# Patient Record
Sex: MALE | Race: WHITE
[De-identification: names, ages, dates, MRNs, and addresses within clinical notes are randomized per-mention and may not be internally consistent; named-entity substitution may affect disease eponyms.]

---

## 2020-09-10 ENCOUNTER — HOSPITAL ENCOUNTER (OUTPATIENT)
Dept: HOSPITAL 47 - RADUSWWP | Age: 85
Discharge: HOME | End: 2020-09-10
Attending: PHYSICIAN ASSISTANT
Payer: MEDICARE

## 2020-09-10 DIAGNOSIS — I70.0: Primary | ICD-10-CM

## 2020-09-10 DIAGNOSIS — N18.3: ICD-10-CM

## 2020-09-10 PROCEDURE — 76770 US EXAM ABDO BACK WALL COMP: CPT

## 2020-09-10 NOTE — US
EXAMINATION TYPE: US kidneys/renal and bladder/ aorta

 

DATE OF EXAM: 9/10/2020

 

COMPARISON: NONE

 

CLINICAL HISTORY: N18.3 Chronic kidney disease stage 3.

 

EXAM MEASUREMENTS:

 

Right Kidney:  9.4 x 4.8 x 4.7 cm

Left Kidney: 9.9 x 5.7 x 5.2 cm

Post Void Residual Volume:  19.3 mL

 

US Aorta (as indicated by Procedure Code from ordering physician): Mild ectasia is noted throughout a
bdominal aorta, however, size is wnl. Irregular intimal wall thickening is noted throughout aorta. 

 

Right Kidney: No hydronephrosis or masses seen  

Left Kidney: No hydronephrosis or masses seen  

Bladder: not prepped; partially distended

**Bilateral Jets seen: yes

**Normal Post Void Residual: yes, as volume is less than 50.0ml.

 

There is no evidence for hydronephrosis at this point in time.  No nephrolithiasis is seen.  No yadira
s are identified.  The urinary bladder is anechoic.  Bilateral ureteral jets are seen.

 

 

 

IMPRESSION:

No distinct abnormality seen. Plaque abdominal aorta.

## 2021-04-06 ENCOUNTER — HOSPITAL ENCOUNTER (OUTPATIENT)
Dept: HOSPITAL 47 - LABWHC1 | Age: 86
Discharge: HOME | End: 2021-04-06
Attending: FAMILY MEDICINE
Payer: MEDICARE

## 2021-04-06 ENCOUNTER — HOSPITAL ENCOUNTER (OUTPATIENT)
Dept: HOSPITAL 47 - LABPAT | Age: 86
Discharge: HOME | End: 2021-04-06
Attending: ORTHOPAEDIC SURGERY
Payer: MEDICARE

## 2021-04-06 DIAGNOSIS — E87.5: Primary | ICD-10-CM

## 2021-04-06 DIAGNOSIS — M16.11: ICD-10-CM

## 2021-04-06 DIAGNOSIS — Z01.812: Primary | ICD-10-CM

## 2021-04-06 PROCEDURE — 87070 CULTURE OTHR SPECIMN AEROBIC: CPT

## 2021-04-06 PROCEDURE — 84132 ASSAY OF SERUM POTASSIUM: CPT

## 2021-04-06 PROCEDURE — 36415 COLL VENOUS BLD VENIPUNCTURE: CPT

## 2021-04-06 PROCEDURE — 86900 BLOOD TYPING SEROLOGIC ABO: CPT

## 2021-04-06 PROCEDURE — 86901 BLOOD TYPING SEROLOGIC RH(D): CPT

## 2021-04-06 PROCEDURE — 86850 RBC ANTIBODY SCREEN: CPT

## 2021-04-12 NOTE — HP
HISTORY AND PHYSICAL



CHIEF COMPLAINT:

Right hip pain.



HISTORY OF PRESENT ILLNESS:

The patient is an 85-year-old retired gentleman who presents with progressive right hip

pain for the past 2 years.  It has progressed recently.  He notes anterior groin and

thigh pain, worse with weightbearing activities.  He has tried therapy in addition

injection and medications with only partial temporary relief.  He notes the pain

severely limits his function and activities.



PAST MEDICAL HISTORY:

Significant for hypertension and arthritis.



PAST SURGICAL HISTORY:

Negative.



CURRENT MEDICATIONS:

Current medications include losartan.



ALLERGIES:

He denies drug allergies.



FAMILY HISTORY:

Negative.



SOCIAL HISTORY:

Significant for previous tobacco use.



REVIEW OF SYSTEMS:

Sixteen-point review of systems otherwise reviewed and is noncontributory.



PHYSICAL EXAMINATION:

On examination, the patient is approximately 5 feet 10 inches, 177 pounds of

mesomorphic habitus.

HEENT exam is nonfocal.

Neck is supple.

On examination of the right hip, he is tender about the anterior aspect.  Passive range

of motion flexion 85 degrees, external rotation with the hip flexed 60 degrees,

internal rotation -10 degrees with pain.  He does have approximately 1 cm shortening of

the right lower extremity compared to the left.  His distal neurovascular exam appears

intact in the right lower extremity.



Previous x-rays of the right hip obtained in the office to include AP and lateral views

show severe osteoarthrosis with bone-on-bone changes.



IMPRESSION:

Right hip severe osteoarthrosis-symptomatic.



RECOMMENDATIONS:

I talked to the patient at length regarding his condition and treatment options.  At

this point, he is quite symptomatic and limited because of pain despite previous

conservative measures.  After thorough discussion, he opts to proceed with surgery.  We

will plan to proceed with right total hip arthroplasty utilizing an anterior approach.

Risks and benefits were discussed at length in layman's terms.  We will institute DVT

prophylaxis postoperatively.





MMODL / IJN: [id number] / Job#: [id number]

## 2021-04-13 ENCOUNTER — HOSPITAL ENCOUNTER (OUTPATIENT)
Dept: HOSPITAL 47 - OR | Age: 86
LOS: 1 days | Discharge: HOME HEALTH | End: 2021-04-14
Attending: ORTHOPAEDIC SURGERY
Payer: MEDICARE

## 2021-04-13 VITALS — BODY MASS INDEX: 26.6 KG/M2

## 2021-04-13 DIAGNOSIS — M16.11: Primary | ICD-10-CM

## 2021-04-13 DIAGNOSIS — I10: ICD-10-CM

## 2021-04-13 DIAGNOSIS — Z97.2: ICD-10-CM

## 2021-04-13 DIAGNOSIS — Z87.891: ICD-10-CM

## 2021-04-13 DIAGNOSIS — Z79.899: ICD-10-CM

## 2021-04-13 DIAGNOSIS — K21.9: ICD-10-CM

## 2021-04-13 PROCEDURE — 97161 PT EVAL LOW COMPLEX 20 MIN: CPT

## 2021-04-13 PROCEDURE — 86850 RBC ANTIBODY SCREEN: CPT

## 2021-04-13 PROCEDURE — 86900 BLOOD TYPING SEROLOGIC ABO: CPT

## 2021-04-13 PROCEDURE — 73501 X-RAY EXAM HIP UNI 1 VIEW: CPT

## 2021-04-13 PROCEDURE — 87635 SARS-COV-2 COVID-19 AMP PRB: CPT

## 2021-04-13 PROCEDURE — 97165 OT EVAL LOW COMPLEX 30 MIN: CPT

## 2021-04-13 PROCEDURE — 85025 COMPLETE CBC W/AUTO DIFF WBC: CPT

## 2021-04-13 PROCEDURE — 27130 TOTAL HIP ARTHROPLASTY: CPT

## 2021-04-13 PROCEDURE — 84132 ASSAY OF SERUM POTASSIUM: CPT

## 2021-04-13 PROCEDURE — 97535 SELF CARE MNGMENT TRAINING: CPT

## 2021-04-13 PROCEDURE — 86901 BLOOD TYPING SEROLOGIC RH(D): CPT

## 2021-04-13 PROCEDURE — 88300 SURGICAL PATH GROSS: CPT

## 2021-04-13 RX ADMIN — POTASSIUM CHLORIDE SCH MLS/HR: 14.9 INJECTION, SOLUTION INTRAVENOUS at 21:01

## 2021-04-13 RX ADMIN — POTASSIUM CHLORIDE SCH MLS: 14.9 INJECTION, SOLUTION INTRAVENOUS at 09:19

## 2021-04-13 NOTE — XR
EXAMINATION TYPE: XR Hip Limited RT

 

DATE OF EXAM: 4/13/2021

 

COMPARISON: NONE

 

HISTORY: Postop

 

TECHNIQUE: One view submitted.

 

FINDINGS:

There is postsurgical change in near anatomic alignment.  There is soft tissue edema and emphysema. 

 

IMPRESSION:

1. Postoperative change.  Appears in near-anatomic alignment.

## 2021-04-13 NOTE — P.OP
Date of Procedure: 04/13/21


Preoperative Diagnosis: 


Right hip severe osteoarthrosis


Postoperative Diagnosis: 


Same


Procedure(s) Performed: 


Right total hip arthroplastypress-fitanterior approach


Implants: 


Depuy Corail size 11 collared press-fit femoral stem with 125 neck, 36+1.5 

cobalt chrome femoral head, 52 mm [place] acetabular shell with neutral 

polyethylene liner.


Anesthesia: spinal


Surgeon: Isael Moreno


Assistant #1: Isreal Tracey


Estimated Blood Loss (ml): 75


Pathology: other (Femoral head)


Condition: stable


Disposition: PACU


Indications for Procedure: 


The patient's an 85-year-old male presents with progressive right hip pain 

secondary osteoarthrosis despite conservative treatment.  After thorough 

discussion the risks and benefits of operative intervention versus continued 

conservative measures he opted to proceed with surgery.  Informed consent was 

obtained.  Specific risks of surgery to include infection, neurovascular injury,

development of blood clots, possible component loosening, possible leg length 

discrepancy, possible instability and need for subsequent procedures was 

discussed.  Informed consent was obtained.


Operative Findings: 


As below


Description of Procedure: 


The patient was brought to the operating room, and after induction of spinal 

anesthesia was placed supine on the Radha table.  Positioning was checked with 

fluoroscopy.  The right hip was then prepped and draped in a normal fashion.  A 

12 cm incision was then made starting 2 fingerbreadths distal and 3 finger 

breaths posterior to the ASIS in line with the proximal femur.  The skin was 

incised sharply.  Subcutaneous tissues were divided sharply.  Electrocautery was

used for hemostasis.  The fascia was split in line with skin incision.  The 

interval between the sartorius and tensor fascia fredi was then bluntly 

developed.  The posterior fascia was opened with electrocautery.  The lateral 

circumflex vessels were identified and cauterized prior to sectioning.  A 

retractor was placed along the superior femoral neck as well as the anterior 

acetabular rim.  A wide capsulotomy was performed.  The neck cut was then made 

at a 45  angle to the shaft approximately 1 1/2 cm above the level of the 

lesser trochanter.  The head was extracted.  Attention was then paid towards 

preparing the acetabular.  Anterior and posterior retractors were placed.  The 

remaining capsular labral tissue sharply debrided clearly defining the 

acetabular margins.  I began reaming with a 47 mm reamer taking care to 

initially medialize then reaming at 45  of abduction and 20  of anteversion.  

Sequential reaming is performed up to 51 mm.  A trial 52 mm acetabular shell was

inserted in the same orientation and was fully seated.  There was good rim fit 

and stability.  Positioning was checked with fluoroscopy.  The final 52 mm 

acetabular shell was inserted again at 45  of abduction and 20  of 

anteversion.  This was fully seated.  There was good rim fit and stability.  

Again fluoroscopy was used to check the adequacy of placement.  A neutral 

polyethylene liner was gently impacted.  Care was taken to avoid any soft tissue

interposition.  Pulsatile lavage was utilized.  Attention was then paid towards 

preparing the proximal femur.  The central region was cleared of soft tissue.  A

canal finder was used to find the femoral canal.  Sequential broaching was 

performed up to size 11 taking care to lateralize proximally.  A calcar mill was

used to fashion the medial calcar.  There was good rotational stability.  A 125

neck along with a 36+1.5 mm  head was placed.  The hip was gently reduced.  

Fluoroscopy was used to check the adequacy of positioning along with leg 

lengths.  I felt both were good.  The hip was gently dislocated.  The trial 

components were removed.  The final size 11 collared 125 press-fit femoral stem

was inserted parallel to the posterior cortex.  This was fully seated and there 

was good rotational stability.  A  36 mm +1.5 head was placed.  This was gently 

impacted.  The hip was then gently reduced.  Final fluoroscopic view showed 

adequate placement implant along with restoration of leg length.  Stability was 

checked with 80  of external rotation and 60  of extension of the right hip.  

The wound was irrigated with sterile lavage.  The fascia was closed with running

0 Vicryl suture.  There was minimal drainage therefore a deep drain was not 

placed.  The second dose of IV TXA was given.     The subcutaneous tissues were 

reapproximated interrupted 2-0 Vicryl sutures.  The skin was reapproximated  

with 3-0 subcuticular strata  fix suture.  Skin tape and adhesive was applied.  

A sterile dressing was applied.  The patient was then awoken from sedation and 

transferred to recovery room in good condition.  Blood loss was estimated at  75

mL.  No complications were incurred.  Sponge and needle counts were correct at 

the end of the case.  Isreal HARPER assisted during the major components is 

case to include exposure, bone resection, implantation, and closure.

## 2021-04-13 NOTE — FL
EXAMINATION TYPE: FL guidance operating room

 

DATE OF EXAM: 4/13/2021

 

HISTORY: Fluoroscopy  time

 

49 seconds of fluoroscopy provided. 

 

IMPRESSION:

1. Fluoroscopy time.

## 2021-04-14 VITALS
RESPIRATION RATE: 16 BRPM | HEART RATE: 81 BPM | DIASTOLIC BLOOD PRESSURE: 76 MMHG | SYSTOLIC BLOOD PRESSURE: 130 MMHG | TEMPERATURE: 97.9 F

## 2021-04-14 LAB
BASOPHILS # BLD AUTO: 0 K/UL (ref 0–0.2)
BASOPHILS NFR BLD AUTO: 0 %
EOSINOPHIL # BLD AUTO: 0 K/UL (ref 0–0.7)
EOSINOPHIL NFR BLD AUTO: 0 %
ERYTHROCYTE [DISTWIDTH] IN BLOOD BY AUTOMATED COUNT: 3.8 M/UL (ref 4.3–5.9)
ERYTHROCYTE [DISTWIDTH] IN BLOOD: 12.8 % (ref 11.5–15.5)
HCT VFR BLD AUTO: 32.6 % (ref 39–53)
HGB BLD-MCNC: 11.2 GM/DL (ref 13–17.5)
LYMPHOCYTES # SPEC AUTO: 1.9 K/UL (ref 1–4.8)
LYMPHOCYTES NFR SPEC AUTO: 17 %
MCH RBC QN AUTO: 29.5 PG (ref 25–35)
MCHC RBC AUTO-ENTMCNC: 34.4 G/DL (ref 31–37)
MCV RBC AUTO: 85.8 FL (ref 80–100)
MONOCYTES # BLD AUTO: 0.8 K/UL (ref 0–1)
MONOCYTES NFR BLD AUTO: 7 %
NEUTROPHILS # BLD AUTO: 8.4 K/UL (ref 1.3–7.7)
NEUTROPHILS NFR BLD AUTO: 74 %
PLATELET # BLD AUTO: 181 K/UL (ref 150–450)
WBC # BLD AUTO: 11.3 K/UL (ref 3.8–10.6)

## 2021-04-14 NOTE — P.DS
Providers


Date of admission: 





04/13/2021


Expected date of discharge: 04/14/21


Attending physician: 


Isael Moreno





Consults: 





                                        





04/13/21 15:07


Consult Physician Routine 


   Consulting Provider: Vimal Sanabria


   Consult Reason/Comments: Medical Management


   Do you want consulting provider notified?: Yes











[facility] 


[facility]





[facility] Course: 





Date of admission: 04/13/2021


Date of discharge: 04/14/2021





Admission diagnosis: Status post direct anterior right total hip arthroplasty


Discharge diagnosis: Same





Attending physician: Dr. Moreno





Surgical procedures: Direct anterior right total hip arthroplasty





Brief history: Patient is a a 85-year-old male with a history of progressive 

primary right hip osteoarthritis.  At this point patient has failed conservative

treatment measures and has opted to proceed with a elective direct anterior 

right total hip arthroplasty





Hospital course: Details of patient's surgery can be found in operative report. 

Patient tolerated the procedure well and was subsequently transported to 

orthopedic floor.  Patient's orthopeidc and medical care was provided daily. 

Patient had daily laboratory tests performed for evaluation of overall blood 

counts.  Patient had daily physical therapy to include strengthening range of 

motion as well as education with walker ambulation. Patient was treated with 

Xarelto for their postoperative DVT prophylaxis during their inpatient stay.  

Patient was noted to have a relatively uneventful postoperative course.  Patient

reported satisfactory pain control with oral pain medications by postoperative 

day 0.  Patient showed satisfactory progress with physical therapy.  Patient 

moved steadily through the program and had no difficulty meeting the goals by 

postoperative day 1.  Given patient's otherwise satisfactory course and having 

met physical therapy goals, plan is to discharge patient home on postoperative 

day 1.





Discharge condition/disposition: Patient will be discharged home in stable 

condition.





Discharge medications: Instructions are given on resumption of patient's normal 

daily medications per primary care recommendation, in addition patient will be 

prescribed Norco 5 mg/325 mg, Colace 100 mg, aspirin 81 mg.





Discharge instructions:


1.  Wound care and infection precautions, keep incision dry and covered while 

showering, no lotions, creams, moisturizers. No soaking, tubs, pools, hottubs. 

Do not scrub over the incision.


2.  Weight-bear as tolerated with walker / cane until follow-up.


3.  Ice and elevate when necessary.  Do not exceed 20 minutes per hour with ice 

pack.


4.  Utilize compression sleeve until seen at first follow up appointment.


5.  Visiting nursing care.


6.  Home physical therapy


7.  Pain meds and anticoagulants per prescription.


8.  Pain medication has potential to cause constipation. Increase oral fluid and

fiber intake. Contact primary care provider if you have not had a bowel movement

within 48 hours after discharge


9.  No anti-inflammatory medication until discussed at first post operative 

visit, this including Motrin, Aleve, Mobic, Diclofenac


10.  Follow up in office at 2 weeks postop with Handy Ann PA-C/Isreal RIOS


11. Follow up with your primary care doctor 7-10 days after discharge.


12. Contact [facility] with any questions, [phone].





 











Procedures: 





Direct anterior right total hip arthroplasty


Patient Condition at Discharge: Good





Plan - Discharge Summary


Discharge Rx Participant: No


New Discharge Prescriptions: 


New


   Docusate [Colace] 100 mg PO DAILY #30 capsule


   Aspirin [Adult Low Dose Aspirin EC] 81 mg PO BID #60 tablet.


   HYDROcodone/APAP 5-325MG [Norco 5-325] 1 tab PO Q6HR PRN #21 tab


     PRN Reason: Pain





No Action


   Losartan [Cozaar] 25 mg PO QAM


   Esomeprazole Magnesium [NexIUM] 40 mg PO QAM


   Atorvastatin [Lipitor] 20 mg PO DAILY


Discharge Medication List





Atorvastatin [Lipitor] 20 mg PO DAILY 04/07/21 [History]


Esomeprazole Magnesium [NexIUM] 40 mg PO QAM 04/07/21 [History]


Losartan [Cozaar] 25 mg PO QAM 04/07/21 [History]


Aspirin [Adult Low Dose Aspirin EC] 81 mg PO BID #60 tablet. 04/14/21 [Rx]


Docusate [Colace] 100 mg PO DAILY #30 capsule 04/14/21 [Rx]


HYDROcodone/APAP 5-325MG [Norco 5-325] 1 tab PO Q6HR PRN #21 tab 04/14/21 [Rx]








Follow up Appointment(s)/Referral(s): 


Rosales Ann PAC [PHYSICIAN ASSISTANT] - 2 Weeks


Activity/Diet/Wound Care/Special Instructions: 


Orthopedic Discharge Instructions:


1.  Wound care and infection precautions, keep incision dry and covered while 

showering, no lotions, creams, moisturizers. No soaking, pools, hot tubs. Do not

scrub over incision.


2.  Weight-bear as tolerated with walker / cane until follow-up.


3.  Ice and elevate when necessary.  Do not exceed 20 minutes per hour with ice 

pack.


4.  Utilize compression sleeve until seen at first follow up appointment.


5.  Pain meds and anticoagulants per prescription.


6.  Pain medication has potential to cause constipation. Increase oral fluid and

fiber intake. Contact primary care provider if you have not had a bowel movement

within 48 hours after discharge.


7.  No anti-inflammatory medication until discussed at first post operative 

visit, this including Motrin, Aleve, Mobic, Diclofenac.


8. Follow up in office at 2 weeks postop with Handy Ann PA-C/Isreal Tracey PA-C


9. Follow up with your primary care doctor 7-10 days after discharge.


10. Contact [facility] with any questions, [phone].





Discharge Disposition: HOME WITH HOME HEALTH SERVICES

## 2021-04-14 NOTE — P.PN
Subjective


Progress Note Date: 04/14/21


Principal diagnosis: 





s/p direct anterior total hip arthroplasty





Patient evaluated at bedside today, he is resting comfortably.  He's done very 

well with physical therapy ambulating with stairs.  He's tolerating regular 

diet.  He is urinating with no difficulty.  He denies any headaches, 

lightheadedness, chest pain or shortness of breath.





Objective





- Vital Signs


Vital signs: 


                                   Vital Signs











Temp  97.9 F   04/14/21 04:53


 


Pulse  81   04/14/21 04:53


 


Resp  16   04/14/21 04:53


 


BP  130/76   04/14/21 04:53


 


Pulse Ox  92 L  04/14/21 04:53








                                 Intake & Output











 04/13/21 04/14/21 04/14/21





 18:59 06:59 18:59


 


Intake Total 1851 200 


 


Output Total 75  


 


Balance 1776 200 


 


Weight 76.6 kg  


 


Intake:   


 


  IV 1751  


 


  Intake, IV Titration 100 200 





  Amount   


 


    Lactated Ringers 1,000 ml 100  





    @ 0 mls/hr IV .STK-MED   





    ONE Rx#:[id number]   


 


    Lactated Ringers 1,000 ml  200 





    @ 20 mls/hr IV .Q24H [facility]   





    Rx#:[id number]   


 


Output:   


 


  Estimated Blood Loss 75  


 


Other:   


 


  Voiding Method  Toilet 


 


  # Voids 1  














- Exam





Right lower extremity:





Incision is clean, dry, and intact.  The exofin fusion tape is in good 

condition.  There is minimal soft tissue swelling and ecchymosis surrounding the

medial and lateral aspects of the incision.  Calf is soft, no tenderness with 

palpation.  Plantar flexion, dorsiflexion, EHL, FHL are intact.  Sensory exam to

light touch throughout the extremity is intact, dorsal pedis pulses 2+.








- Labs


CBC & Chem 7: 


                                 04/14/21 06:27





                                 04/13/21 09:15


Labs: 


                  Abnormal Lab Results - Last 24 Hours (Table)











  04/14/21 Range/Units





  06:27 


 


WBC  11.3 H  (3.8-10.6)  k/uL


 


RBC  3.80 L  (4.30-5.90)  m/uL


 


Hgb  11.2 L  (13.0-17.5)  gm/dL


 


Hct  32.6 L  (39.0-53.0)  %


 


Neutrophils #  8.4 H  (1.3-7.7)  k/uL














Assessment and Plan


Assessment: 





Status post direct anterior right total hip arthroplasty


Plan: 





Pain control, for discharge home on oral medication





DVT prophylaxis, aspirin 81 mg twice a day





Wound care instructions discussed





Home physical therapy and nursing after discharge





Medical recommendations





Discharge planning: Patient will be discharged home today


Time with Patient: Less than 30

## 2022-12-21 ENCOUNTER — HOSPITAL ENCOUNTER (OUTPATIENT)
Dept: HOSPITAL 47 - RADXRYALE | Age: 87
Discharge: HOME | End: 2022-12-21
Attending: PHYSICIAN ASSISTANT
Payer: MEDICARE

## 2022-12-21 DIAGNOSIS — M47.816: ICD-10-CM

## 2022-12-21 DIAGNOSIS — M16.12: Primary | ICD-10-CM

## 2022-12-21 DIAGNOSIS — R06.02: ICD-10-CM

## 2022-12-21 DIAGNOSIS — M51.36: ICD-10-CM

## 2022-12-21 DIAGNOSIS — R53.82: ICD-10-CM

## 2022-12-21 PROCEDURE — 71046 X-RAY EXAM CHEST 2 VIEWS: CPT

## 2022-12-21 PROCEDURE — 72110 X-RAY EXAM L-2 SPINE 4/>VWS: CPT

## 2022-12-21 PROCEDURE — 73502 X-RAY EXAM HIP UNI 2-3 VIEWS: CPT

## 2022-12-21 NOTE — XR
EXAM TYPE: LUMBAR SPINE X RAY SERIES

 

COMPARISON: NONE

 

HISTORY: Pain

 

TECHNIQUE: 4 views are submitted.

 

FINDINGS:

Alignment is anatomic.  The pedicles are intact.  The transverse processes are intact.  There is vacu
um disc and severe degenerative disc disease at levels L2-S1 with hypertrophic spurring and facet art
hropathy. Vascular calcifications are noted. Diffuse osteopenia. Curvature of the spine.

 

IMPRESSION:

1. Multilevel severe degenerative disc disease and facet arthropathy. Comment follow-up MRI.

## 2022-12-21 NOTE — XR
EXAMINATION TYPE: XR chest 2V

 

DATE OF EXAM: 12/21/2022

 

COMPARISON: NONE

 

TECHNIQUE: PA and lateral views submitted.

 

HISTORY: Pain

 

FINDINGS:

The lungs are clear and  there is no pneumothorax, pleural effusion, or focal pneumonia.  There is sc
lerotic change of aorta. No overt failure. AC joint arthropathy bilaterally. Mild hyperinflation.

 

IMPRESSION: 

1. No acute process.

## 2022-12-21 NOTE — XR
EXAMINATION TYPE: XR Hip Complete LT

 

DATE OF EXAM: 12/21/2022

 

COMPARISON: NONE

 

HISTORY: Pain

 

TECHNIQUE: 2 views submitted

 

FINDINGS:

There is no evidence of erosive change or acute fracture. Postsurgical changes of right hip are incid
entally noted. There is mild concentric narrowing of the hip joint with hypertrophic changes in the a
cetabulum. Vascular calcifications seen.

 

 

IMPRESSION:

1. Mild left hip arthropathy concentric narrowing of the joint space. Hypertrophic change of the acet
abulum can be associated with femoral acetabular impingement..

## 2023-04-17 ENCOUNTER — HOSPITAL ENCOUNTER (OUTPATIENT)
Dept: HOSPITAL 47 - PNWHC3 | Age: 88
End: 2023-04-17
Attending: ANESTHESIOLOGY
Payer: MEDICARE

## 2023-04-17 VITALS
SYSTOLIC BLOOD PRESSURE: 166 MMHG | DIASTOLIC BLOOD PRESSURE: 85 MMHG | TEMPERATURE: 97.8 F | RESPIRATION RATE: 18 BRPM | HEART RATE: 73 BPM

## 2023-04-17 DIAGNOSIS — Z79.82: ICD-10-CM

## 2023-04-17 DIAGNOSIS — I10: ICD-10-CM

## 2023-04-17 DIAGNOSIS — M47.26: Primary | ICD-10-CM

## 2023-04-17 DIAGNOSIS — M41.06: ICD-10-CM

## 2023-04-17 DIAGNOSIS — M19.90: ICD-10-CM

## 2023-04-17 DIAGNOSIS — M51.16: ICD-10-CM

## 2023-04-17 DIAGNOSIS — N18.9: ICD-10-CM

## 2023-04-17 DIAGNOSIS — Z87.891: ICD-10-CM

## 2023-04-17 DIAGNOSIS — M48.062: ICD-10-CM

## 2023-04-17 PROCEDURE — 99211 OFF/OP EST MAY X REQ PHY/QHP: CPT

## 2023-04-17 NOTE — P.PAINPG
PQRS Measure Charge Sheet


Comment: 





HISTORY OF PRESENT ILLNESS:


87 yr old male w daughter at side as a referral from [facility] presents 

today w severe and chronic LBP secondary to DDD, spondylosis and facet 

arthropathy without myelopathy for evaluation. Pt states pain level is provoked 

at  9/10 in intensity, constant, localized in the  R lower lumbar spine, burning

in character w shooting pain towards the RLE.  Pain is provoked by walking.  

Pain is alleviated by PT semi weekly x 4 wks in Mar 2023 which provoked pain, 

massage therapy semi weekly since Mar 2023, heat, meds, THC topicals, 

repositioning and rest.





PMH: HTN, OA, CRF


PSH: R Hip Arthroplasty (2021), BL Cataract Resection, R eye surgery


SH: Former tobacco user (quit 40 yrs ago), No ETOH abuse, No illicit drug use


FH: Mo- No Reported History


All: NKDA


Meds: See list





REVIEW OF ORGAN SYSTEMS:


                     CONSTITUTIONAL:  No fevers or chills. No recent weight lo

ss.


                     NEUROLOGICAL: +  numbness and tingling along the distal 

extremities. No seizure disorders or headaches.


                     MUSCULOSKELETAL: + pain 


                     PSYCHIATRIC:  Denies current depression or suicidal 

thoughts.


    


Physical Examinations  :


                Constitutional : Cooperative , not in acute distress .          

                                                                                

                                                                                

                                                                                

                                                                                

     


                Neurologic :   Cranial nerve II   to  XII  intact. No focal 

neurological deficits.


                Psychiatric : alert & oriented  x 3. Matching mood & appropriate

affect. Judgment & insight intact. 


                Musculoskeletal :     


                               Cervical Spine 


                                         Motor strength in the deltoid and 

biceps: Normal  right side. Normal  Left side


                                         Motor strength biceps and the wrist e

xtensors:   Normal right side . Normal left side 


                                         Motor strength in the triceps muscle: 

Normal right side.  Normal  left side  


                                         Deep tendon reflexes:  Normal at the 

biceps. Normal at Brachioradialis. Normal at triceps


                                         Vertebral body tenderness to deep 

palpation over 


                                         Cervical facet loading test: positive 

bilaterally


                                         Spurling test: positive bilaterally


                                         Neck distraction test: positive 

bilaterally


                                         Kelly sign: positive bilaterally


                                  Lumbar spine


                                         Motor strength lower extremities ,thigh

and legs  5/5 Right side ,  5/5  Left side 


                                         Deep tendon reflexes :   Normal  Knee 

Jerk. Normal   Ankle Jerk  


                                         Vertebral body tenderness over R L3, 

L4, L5


                                         Lumbar facet Loading Test: positive 

Right  / positive Left  


                                         Range of motion of the lumbar spine  

Flexion  30 degrees,   extension   10 degrees


                                         Straight Leg Raise test: Left/ Right 

positive at   degree   


                                         Sonal test: positive right /  positive 

left.


                                         Severe tenderness over the Sacroiliac 

joint  on the Right / Left  sides   


                                         Grecia  test: positive bilaterally


                                         Seated flexion test: positive 

bilaterally.


                                 Sacral spine :


                                         Severe tenderness over the Sacroiliac 

joint:  right side / left side 


                                         Range of motion: Flexion of the lumbar 

spine <60 degrees


                                         Range of motion: Extension of the 

lumbar spine <20 degrees


                                         Gaenslen's Test positive 


                                         Elie's Test positive 


                                         Sonal test: positive  right side /  

left side


                                         Thigh Thrust Test


                                         Sacral Thrust Test


Imaging:


Lumbar x ray 12/20/22 reviewed





Assessment/ Plan : 


Lumbar DDD


Recommendation of MRI of the lumbar spine re: M51.36. May follow up within 2-4 

wks All questions answered.


I have spent greater than 30 minutes on patient care today. Dr Gibbons was 

available by phone for the evaluation of this patient. The time was used to 

review the medical records including relevant urine studies and Prescription 

history (MAPs), review of the available imaging, evaluation and examination of 

the patient, coordination of care with the medical staff and if applicable 

referring physicians, as well as creation of the medical record





Home Medications: 


Ambulatory Orders





Atorvastatin [Lipitor] 20 mg PO DAILY 04/07/21 


Esomeprazole Magnesium [NexIUM] 40 mg PO QAM 04/07/21 


Losartan [Cozaar] 25 mg PO QAM 04/07/21 


Aspirin [Adult Low Dose Aspirin EC] 81 mg PO BID #60 tablet. 04/14/21 


Docusate [Colace] 100 mg PO DAILY #30 capsule 04/14/21 


Baclofen 10 mg PO 04/17/23 











Controlled Substance Measures





- Controlled Substance Measures


Is patient prescribed a controlled substance at discharge?: No

## 2023-05-12 ENCOUNTER — HOSPITAL ENCOUNTER (OUTPATIENT)
Dept: HOSPITAL 47 - RADMRIMAIN | Age: 88
Discharge: HOME | End: 2023-05-12
Attending: PHYSICIAN ASSISTANT
Payer: MEDICARE

## 2023-05-12 DIAGNOSIS — M51.26: ICD-10-CM

## 2023-05-12 DIAGNOSIS — M51.36: Primary | ICD-10-CM

## 2023-05-12 DIAGNOSIS — M47.817: ICD-10-CM

## 2023-05-12 DIAGNOSIS — M43.16: ICD-10-CM

## 2023-05-12 DIAGNOSIS — M41.86: ICD-10-CM

## 2023-05-12 PROCEDURE — 72148 MRI LUMBAR SPINE W/O DYE: CPT

## 2023-05-15 NOTE — MR
EXAMINATION TYPE: MR lumbar spine wo con

 

DATE OF EXAM: 5/12/2023

 

COMPARISON: Outside lumbar spine x-ray January 9, 2023.

 

HISTORY: Low back pain that radiates down right leg. Intervertebral disc degeneration per order.

 

TECHNIQUE: Multiplanar, multisequence imaging of the lumbar spine is performed without IV contrast.

 

FINDINGS: There is dextroconvex scoliosis centered at the lumbosacral junction. Slight grade 1 retrol
isthesis L3 on L4 and L4 on L5. Sagittal images of the lumbar spine show vertebral body heights to ap
pear satisfactory. There is multilevel disc desiccation along with mild to moderate multilevel disc s
pace narrowing with relative sparing of L1-L2 level and multilevel vacuum disc phenomenon. The conus 
medullaris is normal in position and signal ending superior L1 level.  The bone marrow signal intensi
ty is within normal limits. Mild/moderate multilevel anterior spurring is redemonstrated.

 

Axial images show T12-L1 and L1-L2 levels to appear within normal limits.

 

Axial images at L2-L3 level show mild to moderate broad-based disc bulging and mild-to-moderate facet
 arthropathy with mild effacement of the anterior thecal sac and mild left-sided anterior inferior ne
ural foraminal narrowing due to left foraminal disc protrusion component.

 

Axial images at L3-L4 level show moderate broad disc bulge mildly effacing anterior thecal sac along 
with mild facet arthropathy mildly effacing the anterior thecal sac. There is mild to moderate bilate
ral neural foraminal narrowing.

 

Axial images at L4-L5 level show moderate broad disc bulge with right lateral disc protrusion compone
nt along with mild/moderate facet arthropathy. There is moderate left along with severe right-sided n
eural foraminal narrowing. Encroachment on right L4 nerve is present.

 

Axial images at L5-S1 level show mild broad disc bulge and mild facet arthropathy. There is severe le
ft-sided neural foraminal narrowing encroaching on the exiting left L5 nerve and mild right-sided axel
ral foraminal narrowing.

 

Paraspinal muscle bulk is maintained.

 

IMPRESSION: Scoliosis with multilevel spondylolisthesis and degenerative change in the lower lumbar s
pine as detailed above. Attention to L4-L5 level where eccentric disc herniation encroaches on the ri
ght L4 nerve likely accounting for patient's radiculopathy type symptoms.

## 2023-05-18 ENCOUNTER — HOSPITAL ENCOUNTER (OUTPATIENT)
Dept: HOSPITAL 47 - PNWHC3 | Age: 88
End: 2023-05-18
Attending: ANESTHESIOLOGY
Payer: MEDICARE

## 2023-05-18 VITALS
TEMPERATURE: 98.1 F | RESPIRATION RATE: 18 BRPM | DIASTOLIC BLOOD PRESSURE: 83 MMHG | HEART RATE: 82 BPM | SYSTOLIC BLOOD PRESSURE: 156 MMHG

## 2023-05-18 DIAGNOSIS — M51.36: Primary | ICD-10-CM

## 2023-05-18 DIAGNOSIS — Z79.82: ICD-10-CM

## 2023-05-18 PROCEDURE — 99211 OFF/OP EST MAY X REQ PHY/QHP: CPT

## 2023-05-22 NOTE — P.PAINPG
PQRS Measure Charge Sheet


Comment: 





87 yr old male w daughter at side presents today w severe and chronic LBP x 1 yr

secondary to DDD, spondylosis and facet arthropathy without myelopathy for MRI 

lumbar spine results. Pt states pain level is provoked at  8/10 in intensity, 

constant, localized in the  R lower lumbar spine, burning in character w 

shooting pain towards the RLE.  Pain is provoked by walking.  Pain is alleviated

by PT semi weekly x 6 wks in Apr 2023 which provoked pain, massage therapy semi 

weekly x 3 wks until Mar 2023 which stopped due to out of pocket costs, heat, 

meds (Zanaflex, ASA), THC topicals, repositioning, sitting and rest.





REVIEW OF ORGAN SYSTEMS:


                     CONSTITUTIONAL:  No fevers or chills. No recent weight 

loss.


                     NEUROLOGICAL: +  numbness and tingling along the distal 

extremities. No seizure disorders or headaches.


                     MUSCULOSKELETAL: + pain 


                     PSYCHIATRIC:  Denies current depression or suicidal 

thoughts.


    


Physical Examinations  :


                Constitutional : Cooperative , not in acute distress .          

                                                                                

                                                                                

                                                                                

                                                                                

     


                Neurologic :   Cranial nerve II   to  XII  intact. No focal 

neurological deficits.


                Psychiatric : alert & oriented  x 3. Matching mood & appropriate

affect. Judgment & insight intact. 


                Musculoskeletal :     


                               Cervical Spine 


                                         Motor strength in the deltoid and 

biceps: Normal  right side. Normal  Left side


                                         Motor strength biceps and the wrist 

extensors:   Normal right side . Normal left side 


                                         Motor strength in the triceps muscle: 

Normal right side.  Normal  left side  


                                         Deep tendon reflexes:  Normal at the 

biceps. Normal at Brachioradialis. Normal at triceps


                                         Vertebral body tenderness to deep 

palpation over 


                                         Cervical facet loading test: positive 

bilaterally


                                         Spurling test: positive bilaterally


                                         Neck distraction test: positive 

bilaterally


                                         Kelly sign: positive bilaterally


                                  Lumbar spine


                                         Motor strength lower extremities ,thigh

and legs  5/5 Right side ,  5/5  Left side 


                                         Deep tendon reflexes :   Normal  Knee 

Jerk. Normal   Ankle Jerk  


                                         Vertebral body tenderness over L4


                                         Lumbar facet Loading Test: positive 

Right  / positive Left  


                                         Range of motion of the lumbar spine  

Flexion  30 degrees,   extension   10 degrees


                                         Straight Leg Raise test: Left/ Right 

positive at <45 degrees   


                                         Sonal test: positive right /  positive 

left.


                                         Severe tenderness over the Sacroiliac 

joint  on the Right / Left  sides   


                                         Gaenslen  test: positive bilaterally


                                         Seated flexion test: positive 

bilaterally.


                                 Sacral spine :


                                         Severe tenderness over the Sacroiliac 

joint:  right side / left side 


                                         Range of motion: Flexion of the lumbar 

spine <60 degrees


                                         Range of motion: Extension of the 

lumbar spine <20 degrees


                                         Gaenslen's Test positive 


                                         Elie's Test positive 


                                         Sonal test: positive  right side /  

left side


                                         Thigh Thrust Test


                                         Sacral Thrust Test


Imaging:


Lumbar MRI non contrast from 5/12/23 reviewed





Assessment/ Plan : 


Lumbar DDD


Recommendation of MARTINA R paramedian L4-L5. May need a series of injections for 

optimal pain releif. Risks, benefits of procedure discussed and pt verbalized un

derstanding. Protocol for discontinuation/ continuation of medications trudy 

procedure discussed. All questions answered.


I have spent greater than 30 minutes on patient care today. Dr Gibbons was jessica

ilable by phone for the evaluation of this patient. The time was used to review 

the medical records including relevant urine studies and Prescription history 

(MAPs), review of the available imaging, evaluation and examination of the 

patient, coordination of care with the medical staff and if applicable referring

physicians, as well as creation of the medical record








PQRS Narrative: 


                                        





Hx Alcohol Use (MH)              No








Home Medications: 


Ambulatory Orders





Atorvastatin [Lipitor] 20 mg PO DAILY 04/07/21 


Esomeprazole Magnesium [NexIUM] 40 mg PO QAM 04/07/21 


Losartan [Cozaar] 25 mg PO QAM 04/07/21 


Aspirin [Adult Low Dose Aspirin EC] 81 mg PO BID #60 tablet. 04/14/21 


Docusate [Colace] 100 mg PO DAILY #30 capsule 04/14/21 


tiZANidine HCL [Zanaflex] 2 mg PO TID PRN 30 Days #90 cap 04/17/23 


tiZANidine HCL [Zanaflex] 2 mg PO TID PRN 30 Days #90 cap 04/19/23 











Controlled Substance Measures





- Controlled Substance Measures


Is patient prescribed a controlled substance at discharge?: No

## 2023-06-15 ENCOUNTER — HOSPITAL ENCOUNTER (OUTPATIENT)
Dept: HOSPITAL 47 - ORPAIN | Age: 88
Discharge: HOME | End: 2023-06-15
Attending: ANESTHESIOLOGY
Payer: MEDICARE

## 2023-06-15 VITALS — SYSTOLIC BLOOD PRESSURE: 171 MMHG | HEART RATE: 71 BPM | RESPIRATION RATE: 15 BRPM | DIASTOLIC BLOOD PRESSURE: 80 MMHG

## 2023-06-15 VITALS — BODY MASS INDEX: 27.3 KG/M2

## 2023-06-15 VITALS — TEMPERATURE: 97.3 F

## 2023-06-15 DIAGNOSIS — M51.16: Primary | ICD-10-CM

## 2023-06-15 DIAGNOSIS — Z88.8: ICD-10-CM

## 2023-06-15 DIAGNOSIS — M47.26: ICD-10-CM

## 2023-06-15 PROCEDURE — 62323 NJX INTERLAMINAR LMBR/SAC: CPT

## 2023-06-15 NOTE — P.PCN
Date of Procedure: 06/15/23


Procedure(s) Performed: 


 


PREOPERATIVE DIAGNOSIS: 


1- Lumbar Degenerative Disc Diseases


2-Lumbar spondylosis with  Facet arthropathy without myelopathy.


3-lumbar radiculopathy


POSTOPERATIVE DIAGNOSIS: 


1-lumbar degenerative disc disease.


2-lumbar spondylosis with  facet arthropathy without myelopathy.


3-lumbar radiculopathy


PROCEDURE


1. Lumbar epidural steroid injection under fluoroscopic guidance at the L4-5 

level ( right paramedial ).    (Fluoroscopy imaging was available in radiology 

department)


2. Lumbar epidurogram. 


ANESTHESIA:  moderate sedation with  intravenous Versed  2  mg ,and fentanyle   

100   Mcg


                          Sedation start time :  


                          Sedation end time  :


EBL: Minimal


PROCEDURE INDICATION: The patient with low back pain and radiculitis symptoms 

unresponsive to conservative treatment. Fluoroscopy was used to optimize v

isualization of the needle placement and to maximize safety. 


PROCEDURE DESCRIPTION / TECHNIQUE: 


  The patient was seen and identified in the preoperative area. Risks, benefits,

complications including but not limited to infections ,bleeding ,allergic 

reaction to the medications ,nerve damage and not complete pain releife , and 

alternatives were discussed with the patient. The patient agreed to proceed with

the procedure and signed the consent. IV was started, and vital signs were 

stable.


  Patient was taken to the OR and time out was completed. The patient was placed

 in the prone position on procedure table and a pillow was placed under the 

abdomen to reduce lumbar lordosis. The  lumbosacral area was prepped  and draped

in the usual sterile fashion.ere closely monitored during the procedure.  Vital 

signs was monitered during the entire procedure.


Using anterior-posterior fluoroscopy, the L4-5 ( right paramedial ) interlaminar

space was identified and the skin over this site was marked and then infiltrated

with 1% lidocaine subcutaneously. Subsequently, a 20-gauge Tuohy epidural needle

was inserted and advanced toward the epidural space using the ``Loss of 

resistance technique and guided by AP and lateral fluoroscopy. The correct 

needle position in the epidural space was verified with the injection of 2 mL of

the water soluble contrast dye Isovue 200  contrast and observing an excellent 

epidurogram with the epidural spread of the dye, after negative aspiration for 

blood and CSF and in the absence of paresthesias. Again after negative 

aspiration, a 6  ml mixture containing 40 mg of Depo-medrol ( Preservetive Free 

), and 2  ml of preservative free Normal Saline, and 2 ml of preservative free 

lidocaine 1% solution was injected and a washout of epidurogram was seen. Needle

was withdrawn intact, skin was cleansed, and bandages were applied. 


COMPLICATIONS: None


DISPOSITION / PLANS: The patient was placed in a supine position and transferred

to the recovery area in a stable condition for observation. There was no 

evidence of lower extremity motor or sensory deficit after the procedure.  

Patient was discharged from the recovery room after meeting discharge criteria. 

Home discharge instructions were given to the patient by the staff. The patient 

was reexamined prior to discharge. The patient will schedule a follow up in the 

clinic in 2-4 weeks.

## 2023-06-15 NOTE — FL
Intraoperative/procedural fluoroscopic services were provided for lumbar epidural injection. Total fl
uoroscopy time is 2.5 seconds with a total of 1 submitted image to PACS. Total DAP [id number].  Pl
ease see the operative note for further details.

## 2023-07-26 ENCOUNTER — HOSPITAL ENCOUNTER (OUTPATIENT)
Dept: HOSPITAL 47 - PNWHC3 | Age: 88
End: 2023-07-26
Attending: ANESTHESIOLOGY
Payer: MEDICARE

## 2023-07-26 VITALS
TEMPERATURE: 98.3 F | RESPIRATION RATE: 15 BRPM | DIASTOLIC BLOOD PRESSURE: 97 MMHG | HEART RATE: 68 BPM | SYSTOLIC BLOOD PRESSURE: 167 MMHG

## 2023-07-26 DIAGNOSIS — M47.817: ICD-10-CM

## 2023-07-26 DIAGNOSIS — Z79.82: ICD-10-CM

## 2023-07-26 DIAGNOSIS — G89.29: ICD-10-CM

## 2023-07-26 DIAGNOSIS — M51.37: Primary | ICD-10-CM

## 2023-07-26 DIAGNOSIS — Z88.8: ICD-10-CM

## 2023-07-26 PROCEDURE — 99211 OFF/OP EST MAY X REQ PHY/QHP: CPT

## 2023-07-26 NOTE — P.PAINPG
PQRS Measure Charge Sheet


Comment: 





87 yr old male w daughter at side presents today w severe and chronic LBP x 1 yr

secondary to DDD, spondylosis and facet arthropathy without myelopathy for 

evaluation s/p R paramedian MARTINA L4-L5 #1. Pt states she experienced 40 % pain 

relief x 10 wks s/p procedure. Pt states pain level is provoked at  8.5/10 in 

intensity, constant, localized in the  R lower lumbar spine, burning in 

character w shooting pain towards the R hip and knee.  Pain is provoked by 

walking.  Pain is alleviated by PT semi weekly x 6 wks in Apr 2023 which 

provoked pain, massage therapy semi weekly x 3 wks until Mar 2023 which stopped 

due to out of pocket costs, heat, meds (Zanaflex, ASA), THC topicals, 

repositioning, sitting and rest. Oswestry axial pain score of 21.





Interventional procedures include MARTINA L4-L5 x1


Medications include Zanaflex, ASA, THC topical


    


Physical Examinations  :


                Constitutional : Cooperative , not in acute distress .          

                                                                                

                                                                                

                                                                                

                                                                                

     


                Neurologic :   Cranial nerve II   to  XII  intact. No focal 

neurological deficits.


                Psychiatric : alert & oriented  x 3. Matching mood & appropriate

affect. Judgment & insight intact. 


                Musculoskeletal :     


                               Cervical Spine 


                                         Motor strength in the deltoid and 

biceps: Normal  right side. Normal  Left side


                                         Motor strength biceps and the wrist 

extensors:   Normal right side . Normal left side 


                                         Motor strength in the triceps muscle: 

Normal right side.  Normal  left side  


                                         Deep tendon reflexes:  Normal at the 

biceps. Normal at Brachioradialis. Normal at triceps


                                         Vertebral body tenderness to deep 

palpation over 


                                         Cervical facet loading test: positive 

bilaterally


                                         Spurling test: positive bilaterally


                                         Neck distraction test: positive 

bilaterally


                                         Kelly sign: positive bilaterally


                                  Lumbar spine


                                         Motor strength lower extremities ,thigh

and legs  5/5 Right side ,  5/5  Left side 


                                         Deep tendon reflexes :   Normal  Knee 

Jerk. Normal   Ankle Jerk  


                                         Vertebral body tenderness  


                                         Lumbar facet Loading Test: positive 

Right  / positive Left  over R L4-L5, L5-S1


                                         Range of motion of the lumbar spine  

Flexion  30 degrees,   extension   10 degrees


                                         Straight Leg Raise test: Left/ Right 

positive at   degrees   


                                         Sonal test: positive right /  positive 

left.


                                         Severe tenderness over the Sacroiliac 

joint  on the Right / Left  sides   


                                         Gaenslen  test: positive bilaterally


                                         Seated flexion test: positive 

bilaterally.


                                 Sacral spine :


                                         Severe tenderness over the Sacroiliac 

joint:  right side / left side 


                                         Range of motion: Flexion of the lumbar 

spine <60 degrees


                                         Range of motion: Extension of the 

lumbar spine <20 degrees


                                         Gaenslen's Test positive 


                                         Elie's Test positive 


                                         Sonal test: positive  right side /  

left side


                                         Thigh Thrust Test


                                         Sacral Thrust Test


Imaging:


Lumbar MRI non contrast from 5/12/23 reviewed





Assessment/ Plan : 


Lumbar DDD


Recommendation of R MBB L4-L5, L5-S1. May need a series of injections for 

optimal pain releif. Risks, benefits of procedure discussed and pt verbalized 

understanding. Protocol for discontinuation/ continuation of medications trudy 

procedure discussed. Advised pt and daughter at side to go to their PCP for a BP

evaluation. Dangers of elevated BP discussed and pt/ daughter at side 

acknowledged understanding. All questions answered.


I have spent greater than 30 minutes on patient care today. Dr Gibbons was 

available by phone for the evaluation of this patient. The time was used to 

review the medical records including relevant urine studies and Prescription 

history (MAPs), review of the available imaging, evaluation and examination of 

the patient, coordination of care with the medical staff and if applicable 

referring physicians, as well as creation of the medical record








PQRS Narrative: 


                                        





Hx Alcohol Use (MH)              No








Home Medications: 


Ambulatory Orders





Atorvastatin [Lipitor] 20 mg PO DAILY 04/07/21 


Esomeprazole Magnesium [NexIUM] 40 mg PO QAM 04/07/21 


Losartan [Cozaar] 25 mg PO QAM 04/07/21 


Aspirin [Adult Low Dose Aspirin EC] 81 mg PO BID #60 tablet. 04/14/21 


Docusate [Colace] 100 mg PO DAILY #30 capsule 04/14/21 











Controlled Substance Measures





- Controlled Substance Measures


Is patient prescribed a controlled substance at discharge?: No

## 2023-08-18 ENCOUNTER — HOSPITAL ENCOUNTER (OUTPATIENT)
Dept: HOSPITAL 47 - ORPAIN | Age: 88
Discharge: HOME | End: 2023-08-18
Attending: ANESTHESIOLOGY
Payer: MEDICARE

## 2023-08-18 VITALS — SYSTOLIC BLOOD PRESSURE: 149 MMHG | HEART RATE: 71 BPM | DIASTOLIC BLOOD PRESSURE: 90 MMHG | RESPIRATION RATE: 18 BRPM

## 2023-08-18 VITALS — TEMPERATURE: 97.1 F

## 2023-08-18 DIAGNOSIS — Z79.82: ICD-10-CM

## 2023-08-18 DIAGNOSIS — E78.5: ICD-10-CM

## 2023-08-18 DIAGNOSIS — Z88.8: ICD-10-CM

## 2023-08-18 DIAGNOSIS — M47.816: Primary | ICD-10-CM

## 2023-08-18 DIAGNOSIS — Z79.899: ICD-10-CM

## 2023-08-18 DIAGNOSIS — N28.9: ICD-10-CM

## 2023-08-18 DIAGNOSIS — I10: ICD-10-CM

## 2023-08-18 DIAGNOSIS — K21.9: ICD-10-CM

## 2023-08-18 PROCEDURE — 64494 INJ PARAVERT F JNT L/S 2 LEV: CPT

## 2023-08-18 PROCEDURE — 64493 INJ PARAVERT F JNT L/S 1 LEV: CPT

## 2023-08-18 NOTE — P.PCN
Date of Procedure: 08/18/23


Description of Procedure: 


Pre- and Post-operative Diagnosis: Lumbar facet arthropathy, and lumbar 

spondylosis without myelopathy.





Procedure:  #1 Diagnostic Medial Branch Block at bilateral Lumbar 4/5


                   and #1 diagnostic dorsal ramus block at Lumbar 5/ sacral ala 

levels (total 4 levels)








Surgeon: Parag Gaona





Anesthesia: Local: 1% Lidocaine, 


                                IV sedation : Versed 1 mg and fentanyl 50 g. 








Complications: None





EBL: None





Specimen removed: None





Fluoroscopic image: Saved to patient electronic medical records.





Indications for Procedure: The patient is well known to pain clinic for his 

chronic low back pain management. The lumbar facet loading test was positive 

with a clinical diagnosis of lumbar facet arthropathy.  Failed with conservative

therapy.  Came here for interventional help for better pain relief. 





Procedure and Findings: The patient was seen and examined. The written informed 

consent was obtained after explaining the risks, benefits and alternatives of 

the procedure to the patient. The patient was brought to the procedure room and 

was placed in the prone position on the operating table table.  A pillow was 

placed under the abdomen to reduce lumbar lordosis.  Standard anesthesia 

monitoring was done through out the procedure.  The skin preparation was done 

with ChloraPrep, and draping was done in usual sterile fashion.  Sterile 

technique was observed throughout the procedure. 





Under fluoroscopic guidance, right the Lumbar  4, 5 and Sacral ala levels were 

identified in the AP view.  For lumbar L4, and L5 levels the targeting area of 

superior articular process, and close to the most medial and superior aspect of 

transverse process identified, marked.  1ml of  1% Lidocaine was used with a 25 

gauge needle to achieve adequate local anesthesia of the skin and subcutaneous 

tissue at each level.  A 22 gauge 3.5 inch spinal needle was placed and advanced

targeting area which was close to the most medial and superior aspect of the 

transverse process. For Lumbar 5/ sacral ala level, fluoroscope was used in the 

anteroposterior view, and the needle tip was placed at the superior and most 

medial part of sacral ala close to the superior articular process.  A bony 

contact was obtained and needle tip position was confirmed at anteroposterior 

view.  No paresthesia was noted.  A negative aspiration was confirmed.  1 ml 

solution per level was injected, the block solution containing 2 ml of 0.5% 

ropivacaine preservative-free solution mixed with 40 MG of Kenalog. The needles 

were removed intact. Lumbar area was cleaned and bandages were applied. 





Disposition : The patient tolerated the procedure very well.  The patient was 

transferred to the recovery room and remained stable until discharged home. The 

patient was given detailed discharge instructions for infection, bleeding, and 

increased pain at the injection site, and was advised to seek immediate medical 

attention should significant side effects develop.  The patient will be 

scheduled with Pain Clinic within 4 weeks  for repeat procedure if it's helpful.

## 2023-08-18 NOTE — FL
Intraoperative/procedural fluoroscopic services were provided. Total fluoroscopy time is 3.0 seconds 
with a total of 1 submitted images to PACS. Please see the operative/procedural note for further deta
ils.

DAP: 0.73794 mGym2

## 2023-09-20 ENCOUNTER — HOSPITAL ENCOUNTER (OUTPATIENT)
Dept: HOSPITAL 47 - PNWHC3 | Age: 88
End: 2023-09-20
Attending: ANESTHESIOLOGY
Payer: MEDICARE

## 2023-09-20 VITALS
SYSTOLIC BLOOD PRESSURE: 126 MMHG | DIASTOLIC BLOOD PRESSURE: 81 MMHG | RESPIRATION RATE: 15 BRPM | HEART RATE: 77 BPM | TEMPERATURE: 98.2 F

## 2023-09-20 DIAGNOSIS — Z79.82: ICD-10-CM

## 2023-09-20 DIAGNOSIS — M51.37: Primary | ICD-10-CM

## 2023-09-20 DIAGNOSIS — Z91.048: ICD-10-CM

## 2023-09-20 PROCEDURE — 99211 OFF/OP EST MAY X REQ PHY/QHP: CPT

## 2023-09-20 NOTE — P.PAINPG
PQRS Measure Charge Sheet


Comment: 





A 88 yr old male w daughter at side presents today w severe and chronic LBP x 1 

yr secondary to DDD, spondylosis and facet arthropathy without myelopathy for 

evaluation s/p R MBB L3-L5 #1. Pt states she experienced 80 % pain relief x 2-3 

wks s/p procedure. Pt states pain level is provoked at  8.5/10 in intensity, 

constant, localized in the  R lower lumbar spine, burning in character w 

shooting pain towards the R hip and knee.  Pain is provoked by walking.  Pain is

alleviated by PT semi weekly x 6 wks in Apr 2023 which provoked pain, massage 

therapy semi weekly x 3 wks until Mar 2023 which stopped due to out of pocket 

costs, THC topicals, repositioning, sitting and rest. Oswestry axial pain score 

of 21.





Interventional procedures include MARTINA L4-L5 x1, R MBB L3-L5 x1


Medications include Denies


    


Physical Examinations  :


                Constitutional : Cooperative , not in acute distress .          

                                                                                

                                                                                

                                                                                

                                                                                

     


                Neurologic :   Cranial nerve II   to  XII  intact. No focal 

neurological deficits.


                Psychiatric : alert & oriented  x 3. Matching mood & appropriate

affect. Judgment & insight intact. 


                Musculoskeletal :     


                               Cervical Spine 


                                         Motor strength in the deltoid and 

biceps: Normal  right side. Normal  Left side


                                         Motor strength biceps and the wrist 

extensors:   Normal right side . Normal left side 


                                         Motor strength in the triceps muscle: 

Normal right side.  Normal  left side  


                                         Deep tendon reflexes:  Normal at the 

biceps. Normal at Brachioradialis. Normal at triceps


                                         Vertebral body tenderness to deep 

palpation over 


                                         Cervical facet loading test: positive 

bilaterally


                                         Spurling test: positive bilaterally


                                         Neck distraction test: positive 

bilaterally


                                         Kelly sign: positive bilaterally


                                  Lumbar spine


                                         Motor strength lower extremities ,thigh

and legs  5/5 Right side ,  5/5  Left side 


                                         Deep tendon reflexes :   Normal  Knee 

Jerk. Normal   Ankle Jerk  


                                         Vertebral body tenderness  


                                         Lumbar facet Loading Test: positive 

Right  / positive Left  over R L4-L5, L5-S1


                                         Range of motion of the lumbar spine  

Flexion  30 degrees,   extension   10 degrees


                                         Straight Leg Raise test: Left/ Right 

positive at   degrees   


                                         Sonal test: positive right /  positive 

left.


                                         Severe tenderness over the Sacroiliac 

joint  on the Right / Left  sides   


                                         Gaenslen  test: positive bilaterally


                                         Seated flexion test: positive 

bilaterally.


                                 Sacral spine :


                                         Severe tenderness over the Sacroiliac 

joint:  right side / left side 


                                         Range of motion: Flexion of the lumbar 

spine <60 degrees


                                         Range of motion: Extension of the 

lumbar spine <20 degrees


                                         Gaenslen's Test positive 


                                         Elie's Test positive 


                                         Sonal test: positive  right side /  

left side


                                         Thigh Thrust Test


                                         Sacral Thrust Test


Imaging:


Lumbar MRI non contrast from 5/12/23 reviewed





Assessment/ Plan : 


Lumbar DDD


Recommendation of R MBB L4-L5, L5-S1 #2. May need a series of injections, up 

until RFA, for optimal pain relief. Risks, benefits of procedure discussed and 

pt verbalized understanding. Protocol for discontinuation/ continuation of 

medications trudy procedure discussed. Advised pt and daughter at side to go to 

their PCP for a BP evaluation. Dangers of elevated BP discussed and pt/ daughter

at side acknowledged understanding. All questions answered.


I have spent greater than 30 minutes on patient care today. Dr Gibbons was 

available by phone for the evaluation of this patient. The time was used to 

review the medical records including relevant urine studies and Prescription 

history (MAPs), review of the available imaging, evaluation and examination of 

the patient, coordination of care with the medical staff and if applicable 

referring physicians, as well as creation of the medical record








PQRS Narrative: 


                                        





Hx Alcohol Use (MH)              No








Home Medications: 


Ambulatory Orders





Atorvastatin [Lipitor] 20 mg PO DAILY 04/07/21 


Esomeprazole Magnesium [NexIUM] 40 mg PO QAM 04/07/21 


Losartan [Cozaar] 25 mg PO QAM 04/07/21 


Aspirin [Adult Low Dose Aspirin EC] 81 mg PO BID #60 tablet. 04/14/21 


Docusate [Colace] 100 mg PO DAILY PRN 08/11/23 


Multivit-Min/FA/Lycopen/Lutein [Centrum Silver Men Tablet] 1 each PO DAILY 

08/11/23 











Controlled Substance Measures





- Controlled Substance Measures


Is patient prescribed a controlled substance at discharge?: No

## 2023-10-20 ENCOUNTER — HOSPITAL ENCOUNTER (OUTPATIENT)
Dept: HOSPITAL 47 - ORPAIN | Age: 88
Discharge: HOME | End: 2023-10-20
Attending: ANESTHESIOLOGY
Payer: MEDICARE

## 2023-10-20 VITALS — RESPIRATION RATE: 16 BRPM | DIASTOLIC BLOOD PRESSURE: 73 MMHG | SYSTOLIC BLOOD PRESSURE: 164 MMHG | HEART RATE: 81 BPM

## 2023-10-20 VITALS — TEMPERATURE: 97.7 F

## 2023-10-20 VITALS — BODY MASS INDEX: 31 KG/M2

## 2023-10-20 DIAGNOSIS — Z79.899: ICD-10-CM

## 2023-10-20 DIAGNOSIS — M51.36: ICD-10-CM

## 2023-10-20 DIAGNOSIS — M47.816: Primary | ICD-10-CM

## 2023-10-20 DIAGNOSIS — K21.9: ICD-10-CM

## 2023-10-20 DIAGNOSIS — I10: ICD-10-CM

## 2023-10-20 DIAGNOSIS — Z79.82: ICD-10-CM

## 2023-10-20 DIAGNOSIS — G89.29: ICD-10-CM

## 2023-10-20 DIAGNOSIS — E78.5: ICD-10-CM

## 2023-10-20 LAB — GLUCOSE BLD-MCNC: 95 MG/DL (ref 70–110)

## 2023-10-20 PROCEDURE — 64493 INJ PARAVERT F JNT L/S 1 LEV: CPT

## 2023-10-20 PROCEDURE — 64494 INJ PARAVERT F JNT L/S 2 LEV: CPT

## 2023-10-20 NOTE — FL
EXAMINATION TYPE: FL guided pain mgmt statistic

 

DATE OF EXAM: 10/20/2023

 

HISTORY: Fluoroscopy  time

 

Total dose area product (DAP) in uGy*m?, mGy*cm? (or similar): 0.71020

 

IMPRESSION:

1. Fluoroscopy time.

## 2023-10-20 NOTE — P.PCN
Date of Procedure: 10/20/23


Surgeon: Erica Luna


Pathology: none sent


Condition: stable


Disposition: PACU


Description of Procedure: 


PREOPERATIVE DIAGNOSIS   :   1-  Lumbar spondylosis with  Facet Arthropathy 

without myelopathy .  2- Lumber degenerative disc disease





POSTOPERATIVE DIAGNOSIS:   1-  Lumbar spondylosis with  Facet Arthropathy 

without myelopathy .  2- Lumber degenerative disc disease


 


PROCEDURE: Diagnostic  Right L4 -5 , and L5-S1 medial branch block under  

fluoroscopy





Physician: Erica Luna MD





ANESTHESIA: Local with 1% lidocaine;and  IV moderate conscious sedation by the 

anesthesia department


EBL: Negligible





COMPLICATION: None.





 


PROCEDURE INDICATION: Chronic low back pain secondary to Facet arthropathy 

unresponsive to conservative treatment.  





PROCEDURE DESCRIPTION:  the patient was seen and identified in the preop holding

area , risks and benefits and possible complications of the procedure and 

alternatives                                                                    

                                                                                

                                                                                

                                             were discussed with the patient, 

and the patient agreed  to proceed with the procedure and signed the consent.   

                                                                                

                                                     


 IV was started and vital signs monitored during the  procedure and fluoroscopy 

was used to maximize the benefit and accuracy of the needle placement,  sedation

was given to decrease patient  anxiety, patient was taken to the procedure room 

and placed in prone position vital signs monitored.





The patient was brought into the procedure room and placed in prone position.  

Skin was prepped with Chloraprep and  draped in a sterile manner.   Lidocaine 1%

was used to numb the skin up at the target points that were chosen as follows: 

at the L5-S1 level which corresponds to the dorsal ramus of L5 the target point 

was at the superior medial aspect of the sacral ala on the right side  of the 

spine on the AP view of fluoroscopy, and for the L3 and L4 medial branches the 

target points were at  the connection between the transverse process and the 

superior articular process of   L4 and L5 respectively on the right oblique view

of fluoroscopy.  I used 22-gauge 3-1/2 inch Quincke spinal needles for this 

procedure and after contacting bone at the target points mentioned above I 

injected 1 mL of a mixture of Kenalog 40 mg +2 MLS  of Ropivacaine 0.5% PF .  

Patient tolerated procedure well.  At the end of the procedure  the needles r

emoved and a bandage applied after the skin was cleaned the cleaning solution. 

patient was then taken to the recovery room in stable condition and monitored in

the recovery room for 20-30 minutes and discharged home in stable condition 

after discharge criteria met .  





A copy of the needle placement picture was saved to the C-arm machine.

## 2023-11-20 ENCOUNTER — HOSPITAL ENCOUNTER (OUTPATIENT)
Dept: HOSPITAL 47 - PNWHC3 | Age: 88
End: 2023-11-20
Attending: ANESTHESIOLOGY
Payer: MEDICARE

## 2023-11-20 VITALS
TEMPERATURE: 98.4 F | SYSTOLIC BLOOD PRESSURE: 174 MMHG | HEART RATE: 101 BPM | DIASTOLIC BLOOD PRESSURE: 105 MMHG | RESPIRATION RATE: 15 BRPM

## 2023-11-20 DIAGNOSIS — Z88.8: ICD-10-CM

## 2023-11-20 DIAGNOSIS — Z79.82: ICD-10-CM

## 2023-11-20 DIAGNOSIS — M51.37: Primary | ICD-10-CM

## 2023-11-20 PROCEDURE — 99211 OFF/OP EST MAY X REQ PHY/QHP: CPT

## 2023-11-20 NOTE — P.PAINPG
PQRS Measure Charge Sheet


Comment: 





A 88 yr old male w daughter at side presents today w severe and chronic LBP x 1 

yr secondary to DDD, spondylosis and facet arthropathy without myelopathy for 

evaluation s/p R MBB L3-L5 #2. Pt states she experienced 80 % pain relief x 2 

wks s/p procedure. Pt states pain level is provoked at  8.5/10 in intensity, 

constant, localized in the  R lower lumbar spine, predominantly axial burning in

character w occasional shooting pain towards the R hip and knee.  Pain is 

provoked by walking.  Pain is alleviated by PT semi weekly x 6 wks in Apr 2023 

which provoked pain, massage therapy semi weekly x 3 wks until Mar 2023 which 

stopped due to out of pocket costs, THC topicals, repositioning, sitting and 

rest. Oswestry axial pain score of 20.





Interventional procedures include MARTINA L4-L5 x1, R MBB L3-L5 x2


Medications include Denies


    


Physical Examinations  :


                Constitutional : Cooperative , not in acute distress .          

                                                                                

                                                                                

                                                                                

                                                                                

     


                Neurologic :   Cranial nerve II   to  XII  intact. No focal 

neurological deficits.


                Psychiatric : alert & oriented  x 3. Matching mood & appropriate

affect. Judgment & insight intact. 


                Musculoskeletal :     


                               Cervical Spine 


                                         Motor strength in the deltoid and 

biceps: Normal  right side. Normal  Left side


                                         Motor strength biceps and the wrist 

extensors:   Normal right side . Normal left side 


                                         Motor strength in the triceps muscle: 

Normal right side.  Normal  left side  


                                         Deep tendon reflexes:  Normal at the 

biceps. Normal at Brachioradialis. Normal at triceps


                                         Vertebral body tenderness to deep 

palpation over 


                                         Cervical facet loading test: positive 

bilaterally


                                         Spurling test: positive bilaterally


                                         Neck distraction test: positive 

bilaterally


                                         Kelly sign: positive bilaterally


                                  Lumbar spine


                                         Motor strength lower extremities ,thigh

and legs  5/5 Right side ,  5/5  Left side 


                                         Deep tendon reflexes :   Normal  Knee 

Jerk. Normal   Ankle Jerk  


                                         Vertebral body tenderness  


                                         Lumbar facet Loading Test: positive 

Right  / positive Left  over R L4-L5, L5-S1


                                         Range of motion of the lumbar spine  

Flexion  30 degrees,   extension   10 degrees


                                         Straight Leg Raise test: Left/ Right 

positive at   degrees   


                                         Sonal test: positive right /  positive 

left.


                                         Severe tenderness over the Sacroiliac 

joint  on the Right / Left  sides   


                                         Gaenslen  test: positive bilaterally


                                         Seated flexion test: positive 

bilaterally.


                                 Sacral spine :


                                         Severe tenderness over the Sacroiliac 

joint:  right side / left side 


                                         Range of motion: Flexion of the lumbar 

spine <60 degrees


                                         Range of motion: Extension of the 

lumbar spine <20 degrees


                                         Gaenslen's Test positive 


                                         Elie's Test positive 


                                         Sonal test: positive  right side /  

left side


                                         Thigh Thrust Test


                                         Sacral Thrust Test


Imaging:


Lumbar MRI non contrast from 5/12/23 reviewed





Assessment/ Plan : 


Lumbar DDD


Recommendation of R RFA L4-L5, L5-S1. May need a series of injections, up until 

RFA, for optimal pain relief. Risks, benefits of procedure discussed and pt 

verbalized understanding. Protocol for discontinuation/ continuation of 

medications trudy procedure discussed. Advised pt and daughter at side to go to 

their PCP for a BP evaluation. Dangers of elevated BP discussed and pt/ daughter

at side acknowledged understanding. All questions answered.


I have spent greater than 30 minutes on patient care today. Dr Gibbons was 

available by phone for the evaluation of this patient. The time was used to 

review the medical records including relevant urine studies and Prescription 

history (MAPs), review of the available imaging, evaluation and examination of 

the patient, coordination of care with the medical staff and if applicable 

referring physicians, as well as creation of the medical record


   


PQRS Narrative: 


                                        





Hx Alcohol Use (MH)              No








Home Medications: 


Ambulatory Orders





Atorvastatin [Lipitor] 20 mg PO DAILY 04/07/21 


Esomeprazole Magnesium [NexIUM] 40 mg PO QAM 04/07/21 


Losartan [Cozaar] 25 mg PO QAM 04/07/21 


Aspirin [Adult Low Dose Aspirin EC] 81 mg PO BID #60 tablet. 04/14/21 


Docusate [Colace] 100 mg PO DAILY PRN 08/11/23 


Mv-Min/Folic/K1/Lycopen/Lutein [Centrum Silver Men Tablet] 1 each PO DAILY 

08/11/23 











Controlled Substance Measures





- Controlled Substance Measures


Is patient prescribed a controlled substance at discharge?: No

## 2023-12-08 ENCOUNTER — HOSPITAL ENCOUNTER (OUTPATIENT)
Dept: HOSPITAL 47 - ORPAIN | Age: 88
Discharge: HOME | End: 2023-12-08
Attending: ANESTHESIOLOGY
Payer: MEDICARE

## 2023-12-08 VITALS — TEMPERATURE: 98.2 F

## 2023-12-08 VITALS — SYSTOLIC BLOOD PRESSURE: 145 MMHG | HEART RATE: 82 BPM | RESPIRATION RATE: 16 BRPM | DIASTOLIC BLOOD PRESSURE: 77 MMHG

## 2023-12-08 DIAGNOSIS — E78.5: ICD-10-CM

## 2023-12-08 DIAGNOSIS — M51.36: Primary | ICD-10-CM

## 2023-12-08 DIAGNOSIS — I10: ICD-10-CM

## 2023-12-08 DIAGNOSIS — K21.9: ICD-10-CM

## 2023-12-08 DIAGNOSIS — M47.816: ICD-10-CM

## 2023-12-08 DIAGNOSIS — Z79.899: ICD-10-CM

## 2023-12-08 DIAGNOSIS — Z88.8: ICD-10-CM

## 2023-12-08 DIAGNOSIS — Z87.442: ICD-10-CM

## 2023-12-08 PROCEDURE — 64635 DESTROY LUMB/SAC FACET JNT: CPT

## 2023-12-08 PROCEDURE — 64636 DESTROY L/S FACET JNT ADDL: CPT

## 2023-12-08 NOTE — P.PCN
Date of Procedure: 12/08/23


Procedure(s) Performed: 





PREOPERATIVE DIAGNOSIS: 1-Lumbar Spondylosis with  Facet Arthropathy without 

myelopathy.    2- Lumber degenerative disc disease.





POSTOPERATIVE DIAGNOSIS: 1- Lumbar Spondylosis with Facet Arthropathy without 

myelopathy.   2-  Lumber degenerative disc disease.





PROCEDURES : Right Radiofrequency thermocoagulation, L3 , L4 , and L5   medial 

branch, with fluoroscopic guidance (fluoroscopy images available in the 

radiology department)


                        ( to denervate the facet joint at Right  L4-5 ,and L5-S1

levels ).





ANESTHESIA: Monitored anesthesia care as per anesthesia department. 


EBL: Minimal 


PROCEDURE INDICATION: The patient with low back pain secondary to lumbar facet  

arthropathy who had more than 50% relief of her pain with previous diagnostic 

lumbar medial branch block with bupivacaine. 


PROCEDURE DESCRIPTION / TECHNIQUE: The patient was seen and identified in the 

preoperative area. Risks, benefits, complications, including but not limited to 

risk of infection ,bleeding , allergic reactions to the medications and no 

complete pain releife , and alternatives were discussed with the patient, the 

patient agreed to proceed with the procedure and signed the consent. IV was 

started. Vital signs remained stable throughout the procedure.


Patient was taken to the OR and time out was completed. The patient was placed 

in the prone position on the procedure table. The lumber  area was prepped and 

draped in the usual sterile fashion. . Vital signs were closely monitored during

the procedure .IV sedation was used during the procedure to decrease patients 

anxiety. 


Using AP and then oblique fluoroscopy, the ``eye of the eGovani dog  

corresponding to the connection between the superior and transverse articular 

processes of   right L3, L4, and L5 were identified, marked, and localized with 

1% lidocaine.  Subsequently, a 18  [id number] radiofrequency cannula with a 10-

mm active tip was advanced guided by fluoroscopy to each of the``eyes of the 

Geovani dog at   right L3, L4, and L5. Each site then underwent sensory testing 

at 50 Hz and 0 to 1 volt  and motor testing at 2.5  Hz and 0 to 3 volt with 

local stimulation, but no radicular symptoms down the legs. Thereafter each 

sites underwent radiofrequency thermocoagulation  at 80 degrees celsius for 90 

seconds after injecting 0.5 ml of PF Ropivacaine 1ml,  then after the  

thermocoagulation done , 1 ml of the block solution containing  Depo-Medrol 20 

mg and 3 ml of Ropivacaine  0.5%  was injected at the right L3 , L4  , and L5 , 

levels after negative aspiration of CSF and blood and with no paresthesias. 

Cannulas were retracted while injecting lidocaine 1% until the needle is out. . 


At the end of the procedure, the skin was cleansed and bandages were applied.


COMPLICATIONS: No acute complications.


DISPOSITION / PLANS: The patient was placed in a supine position and  

transferred to the recovery area in a stable condition for observation  and was 

discharged from the recovery room after meeting discharge criteria. Home 

discharge instructions given to the patient by the staff. The patient was 

reexamined prior to discharge. The patient will schedule a follow up in the 

clinic in 2-4 weeks.

## 2023-12-08 NOTE — FL
EXAMINATION TYPE: FL guided pain mgmt statistic

 

DATE OF EXAM: 12/8/2023

 

HISTORY: Fluoroscopy  time

 

Total dose area product (DAP) in uGy*m?, mGy*cm? (or similar): 0.48277

 

IMPRESSION:

1. Fluoroscopy time.

## 2024-01-17 ENCOUNTER — HOSPITAL ENCOUNTER (OUTPATIENT)
Dept: HOSPITAL 47 - PNWHC3 | Age: 89
End: 2024-01-17
Attending: ANESTHESIOLOGY
Payer: MEDICARE

## 2024-01-17 VITALS
TEMPERATURE: 96.9 F | HEART RATE: 85 BPM | SYSTOLIC BLOOD PRESSURE: 137 MMHG | DIASTOLIC BLOOD PRESSURE: 70 MMHG | RESPIRATION RATE: 16 BRPM

## 2024-01-17 DIAGNOSIS — M47.817: ICD-10-CM

## 2024-01-17 DIAGNOSIS — M51.37: Primary | ICD-10-CM

## 2024-01-17 DIAGNOSIS — Z88.8: ICD-10-CM

## 2024-01-17 DIAGNOSIS — Z79.82: ICD-10-CM

## 2024-01-17 PROCEDURE — 99211 OFF/OP EST MAY X REQ PHY/QHP: CPT

## 2024-01-17 NOTE — P.PAINPG
PQRS Measure Charge Sheet


Comment: 





A 88 yr old male w daughter at side presents today w severe and chronic LBP x 1 

yr secondary to DDD, spondylosis and facet arthropathy without myelopathy for 

evaluation s/p R RFA L3-L5. Pt states she experienced 50 % pain relief s/p 

procedure. Pt states pain level is provoked at  7/10 in intensity, constant, 

localized in the  R lower lumbar spine, predominantly axial achy in character w 

occasional shooting pain towards the R hip and R knee.  Pain is provoked by 

walking.  Pain is alleviated by PT semi weekly x 6 wks in Apr 2023 which 

provoked pain, massage therapy semi weekly x 3 wks until Mar 2023 which stopped 

due to out of pocket costs, THC topicals, repositioning, sitting and rest. 

Oswestry axial pain score of 19.





Interventional procedures include MARTINA L4-L5 x1, R MBB L3-L5 x2


Medications include Denies


    


Physical Examinations  :


                Constitutional : Cooperative , not in acute distress .          

                                                                                

                                                                                

                                                                                

                                                                                

     


                Neurologic :   Cranial nerve II   to  XII  intact. No focal 

neurological deficits.


                Psychiatric : alert & oriented  x 3. Matching mood & appropriate

affect. Judgment & insight intact. 


                Musculoskeletal :     


                               Cervical Spine 


                                         Motor strength in the deltoid and 

biceps: Normal  right side. Normal  Left side


                                         Motor strength biceps and the wrist 

extensors:   Normal right side . Normal left side 


                                         Motor strength in the triceps muscle: 

Normal right side.  Normal  left side  


                                         Deep tendon reflexes:  Normal at the 

biceps. Normal at Brachioradialis. Normal at triceps


                                         Vertebral body tenderness to deep 

palpation over 


                                         Cervical facet loading test: positive 

bilaterally


                                         Spurling test: positive bilaterally


                                         Neck distraction test: positive 

bilaterally


                                         Kelly sign: positive bilaterally


                                  Lumbar spine


                                         Motor strength lower extremities ,thigh

and legs  5/5 Right side ,  5/5  Left side 


                                         Deep tendon reflexes :   Normal  Knee 

Jerk. Normal   Ankle Jerk  


                                         Vertebral body tenderness  


                                         Taut bands w twitch response over R L2-

S1


                                         Lumbar facet Loading Test: positive 

Right  / positive Left  over R L4-L5, L5-S1


                                         Range of motion of the lumbar spine  

Flexion  30 degrees,   extension   10 degrees


                                         Straight Leg Raise test: Left/ Right 

positive at   degrees   


                                         Sonal test: positive right /  positive 

left.


                                         Severe tenderness over the Sacroiliac 

joint  on the Right / Left  sides   


                                         Gaenslen  test: positive bilaterally


                                         Seated flexion test: positive 

bilaterally.


                                 Sacral spine :


                                         Severe tenderness over the Sacroiliac 

joint:  right side / left side 


                                         Range of motion: Flexion of the lumbar 

spine <60 degrees


                                         Range of motion: Extension of the 

lumbar spine <20 degrees


                                         Gaenslen's Test positive 


                                         Elie's Test positive 


                                         Sonal test: positive  right side /  

left side


                                         Thigh Thrust Test


                                         Sacral Thrust Test


Imaging:


Lumbar MRI non contrast from 5/12/23 reviewed





Assessment/ Plan : 


Lumbar DDD


Recommendation of R TPIs L2-S1. May need a series of injections for optimal pain

relief. Risks, benefits of procedure discussed and pt verbalized understanding. 

Protocol for discontinuation/ continuation of medications trudy procedure 

discussed. All questions answered.


I have spent greater than 30 minutes on patient care today. Dr Gibbons was 

available by phone for the evaluation of this patient. The time was used to 

review the medical records including relevant urine studies and Prescription 

history (MAPs), review of the available imaging, evaluation and examination of 

the patient, coordination of care with the medical staff and if applicable 

referring physicians, as well as creation of the medical record


   





- Pain Location


  ** Bilateral Lower Back


Non-Pharmacological Interventions: Chiropractic Treatment, Heat, Inactivity, 

Massage, Physical Therapy, Position/Reposition, Sitting, Standing


Pharmacological Interventions: Block, Epidural, PRN Medication, Topical 

Medication


PQRS Narrative: 


                                        





Hx Alcohol Use (MH)              No








Home Medications: 


Ambulatory Orders





Atorvastatin [Lipitor] 20 mg PO QAM 04/07/21 


Esomeprazole Magnesium [NexIUM] 40 mg PO QAM 04/07/21 


Losartan [Cozaar] 25 mg PO QAM 04/07/21 


Aspirin [Adult Low Dose Aspirin EC] 81 mg PO BID #60 tablet. 04/14/21 


Docusate [Colace] 100 mg PO DAILY PRN 08/11/23 


Mv-Min/Folic/K1/Lycopen/Lutein [Centrum Silver Men Tablet] 1 each PO QAM 

08/11/23 


methocarbamoL [Robaxin] 500 mg PO TID PRN 30 Days #90 tab 01/17/24 











Controlled Substance Measures





- Controlled Substance Measures


Is patient prescribed a controlled substance at discharge?: No

## 2024-01-30 ENCOUNTER — HOSPITAL ENCOUNTER (OUTPATIENT)
Dept: HOSPITAL 47 - ORPAIN | Age: 89
Discharge: HOME | End: 2024-01-30
Attending: ANESTHESIOLOGY
Payer: MEDICARE

## 2024-01-30 VITALS — SYSTOLIC BLOOD PRESSURE: 158 MMHG | DIASTOLIC BLOOD PRESSURE: 91 MMHG | HEART RATE: 79 BPM

## 2024-01-30 VITALS — TEMPERATURE: 97.7 F | RESPIRATION RATE: 18 BRPM

## 2024-01-30 VITALS — BODY MASS INDEX: 25.8 KG/M2

## 2024-01-30 DIAGNOSIS — M47.816: ICD-10-CM

## 2024-01-30 DIAGNOSIS — M79.18: Primary | ICD-10-CM

## 2024-01-30 PROCEDURE — 20553 NJX 1/MLT TRIGGER POINTS 3/>: CPT

## 2024-01-30 NOTE — P.PCN
Date of Procedure: 01/30/24


Procedure(s) Performed: 





Procedure= trigger point injections lumbar paraspinal muscles bilaterally , 4 on

the right side from L2 to S1, and 2 on the left side from L2 to S1


Preoperative diagnosis=  1-myofascial pain syndrome lumbar paraspinal muscles  

2-lumbar degenerative disc disease  3-lumbar facet arthropathy  


Postoperative diagnosis=Same as preop Diagnosis . 


Complication = none  


Condition= stable


Anesthesia= none.


Indication for the procedure= patient complaining of low back pain , examination

was positive for  multiple trigger point in the lumbar paraspinal muscles 

bilaterally and patient diagnosed with myofascial pain syndrome and is here to 

have trigger point injections   


Description of the procedure= procedure risk and benefits discussed with the 

patient, including but not limited, risk of infection and bleeding, and ALLERGIC

reaction to the medication and not complete pain relief and patient agreed with 

the preceding patient taken to the operating room, placed in sitting  position 

or standard monitors applied to the patient then after induction of anesthesia 

back prepped with chlorhexidine 3 times , then under sterile technique each of 

the trigger point that was marked in the preop holding area 4 on the right side 

lumbar paraspinal muscles and 2 on the left side lumbar paraspinal muscles each 

one of them injected with the 2 mL of the mixture of ropivacaine 0.5% 12 ML 

mixed with 40 mg of Depo-Medrol and 2 mL of the mixture injected at each trigger

point after negative aspiration, using 25-gauge needle, injection done after 

negative aspiration under was no paresthesia during the injection patient 

tolerated the procedure well without any complications and he will follow up in 

the pain clinic in a few weeks

## 2024-03-11 ENCOUNTER — HOSPITAL ENCOUNTER (OUTPATIENT)
Dept: HOSPITAL 47 - PNWHC3 | Age: 89
End: 2024-03-11
Attending: ANESTHESIOLOGY
Payer: MEDICARE

## 2024-03-11 VITALS
DIASTOLIC BLOOD PRESSURE: 74 MMHG | TEMPERATURE: 98.6 F | HEART RATE: 89 BPM | RESPIRATION RATE: 15 BRPM | SYSTOLIC BLOOD PRESSURE: 138 MMHG

## 2024-03-11 DIAGNOSIS — Z79.82: ICD-10-CM

## 2024-03-11 DIAGNOSIS — M51.36: Primary | ICD-10-CM

## 2024-03-11 DIAGNOSIS — Z88.8: ICD-10-CM

## 2024-03-11 PROCEDURE — 99211 OFF/OP EST MAY X REQ PHY/QHP: CPT

## 2024-03-11 NOTE — P.PAINPG
PQRS Measure Charge Sheet


Comment: 





A 88 yr old male w female  at side presents today w severe and chronic 

LBP x 1 yr secondary to DDD, spondylosis and facet arthropathy without 

myelopathy for evaluation s/p BL TPIs L2-S1 #1. Pt states she experienced 60 % 

pain relief x 6 wks s/p procedure. Pt states pain level is provoked at  6/10 in 

intensity, constant, localized in the  R lower lumbar spine, predominantly axial

achy in character w occasional shooting pain towards the R hip and R lateral 

thigh.  Pain is provoked by walking.  Pain is alleviated by PT semi weekly x 6 

wks in Apr 2023 which provoked pain, massage therapy semi weekly x 3 wks until 

Mar 2023 which stopped due to out of pocket costs, THC topical, repositioning, 

sitting and rest. Oswestry axial pain score of 18.





Interventional procedures include MARTINA L4-L5 x1, R MBB L3-L5 x2, BL TPIs L2-S1 x1


Medications include Cannabis topical


    


Physical Examinations  :


                Constitutional : Cooperative , not in acute distress .          

                                                                                

                                                                                

                                                                                

                                                                                

     


                Neurologic :   Cranial nerve II   to  XII  intact. No focal 

neurological deficits.


                Psychiatric : alert & oriented  x 3. Matching mood & appropriate

affect. Judgment & insight intact. 


                Musculoskeletal :     


                               Cervical Spine 


                                         Motor strength in the deltoid and 

biceps: Normal  right side. Normal  Left side


                                         Motor strength biceps and the wrist 

extensors:   Normal right side . Normal left side 


                                         Motor strength in the triceps muscle: 

Normal right side.  Normal  left side  


                                         Deep tendon reflexes:  Normal at the 

biceps. Normal at Brachioradialis. Normal at triceps


                                         Vertebral body tenderness to deep 

palpation over 


                                         Cervical facet loading test: positive 

bilaterally


                                         Spurling test: positive bilaterally


                                         Neck distraction test: positive 

bilaterally


                                         Kelly sign: positive bilaterally


                                  Lumbar spine


                                         Motor strength lower extremities ,thigh

and legs  5/5 Right side ,  5/5  Left side 


                                         Deep tendon reflexes :   Normal  Knee 

Jerk. Normal   Ankle Jerk  


                                         Vertebral body tenderness over L3


                                                   Leavitt test positive R L3-L4


                                         Taut bands w twitch response  


                                         Lumbar facet Loading Test: positive 

Right  / positive Left  over R L4-L5, L5-S1


                                         Range of motion of the lumbar spine  

Flexion  30 degrees,   extension   10 degrees


                                         Straight Leg Raise test: Left/ Right 

positive at   degrees   


                                         Sonal test: positive right /  positive 

left.


                                         Severe tenderness over the Sacroiliac 

joint  on the Right / Left  sides   


                                         Gaenslen  test: positive bilaterally


                                         Seated flexion test: positive 

bilaterally.


                                 Sacral spine :


                                         Severe tenderness over the Sacroiliac 

joint:  right side / left side 


                                         Range of motion: Flexion of the lumbar 

spine <60 degrees


                                         Range of motion: Extension of the 

lumbar spine <20 degrees


                                         Gaenslen's Test positive 


                                         Elie's Test positive 


                                         Sonal test: positive  right side /  

left side


                                         Thigh Thrust Test


                                         Sacral Thrust Test


Imaging:


Lumbar MRI non contrast from 5/12/23 reviewed





Assessment/ Plan : 


Lumbar DDD


Recommendation of R TFESI L3-L4 #1. May need a series of injections for optimal 

pain relief. Risks, benefits of procedure discussed and pt verbalized 

understanding. Protocol for discontinuation/ continuation of medications trudy 

procedure discussed. All questions answered.


I have spent greater than 30 minutes on patient care today. Dr Gibbons was 

available by phone for the evaluation of this patient. The time was used to 

review the medical records including relevant urine studies and Prescription 

history (MAPs), review of the available imaging, evaluation and examination of 

the patient, coordination of care with the medical staff and if applicable 

referring physicians, as well as creation of the medical record


   


PQRS Narrative: 


                                        





Hx Alcohol Use (MH)              No








Home Medications: 


Ambulatory Orders





Atorvastatin [Lipitor] 20 mg PO QAM 04/07/21 


Esomeprazole Magnesium [NexIUM] 40 mg PO QAM 04/07/21 


Losartan [Cozaar] 25 mg PO QAM 04/07/21 


Aspirin [Adult Low Dose Aspirin EC] 81 mg PO BID #60 tablet. 04/14/21 


Docusate [Colace] 100 mg PO DAILY PRN 08/11/23 


Mv-Min/Folic/K1/Lycopen/Lutein [Centrum Silver Men Tablet] 1 each PO QAM 

08/11/23 


methocarbamoL [Robaxin] 500 mg PO TID PRN 30 Days #90 tab 01/17/24 


Naproxen Sodium [Aleve] 400 mg PO Q8HR PRN 01/24/24 











Controlled Substance Measures





- Controlled Substance Measures


Is patient prescribed a controlled substance at discharge?: No

## 2024-03-28 ENCOUNTER — HOSPITAL ENCOUNTER (OUTPATIENT)
Dept: HOSPITAL 47 - ORPAIN | Age: 89
Discharge: HOME | End: 2024-03-28
Attending: ANESTHESIOLOGY
Payer: MEDICARE

## 2024-03-28 VITALS — DIASTOLIC BLOOD PRESSURE: 96 MMHG | SYSTOLIC BLOOD PRESSURE: 174 MMHG | RESPIRATION RATE: 20 BRPM

## 2024-03-28 VITALS — TEMPERATURE: 97.5 F

## 2024-03-28 VITALS — HEART RATE: 74 BPM

## 2024-03-28 DIAGNOSIS — Z79.1: ICD-10-CM

## 2024-03-28 DIAGNOSIS — Z88.8: ICD-10-CM

## 2024-03-28 DIAGNOSIS — M47.26: ICD-10-CM

## 2024-03-28 DIAGNOSIS — Z79.811: ICD-10-CM

## 2024-03-28 DIAGNOSIS — Z79.82: ICD-10-CM

## 2024-03-28 DIAGNOSIS — M51.16: Primary | ICD-10-CM

## 2024-03-28 PROCEDURE — 64483 NJX AA&/STRD TFRM EPI L/S 1: CPT

## 2024-03-28 RX ADMIN — LOSARTAN POTASSIUM STA MG: 25 TABLET, FILM COATED ORAL at 10:36

## 2024-03-28 NOTE — P.PCN
Date of Procedure: 03/28/24


Procedure(s) Performed: 








PREOPERATIVE DIAGNOSIS: 1-Lumbar radiculopathy . 2-lumbar degenerative disc 

disease.  3-lumbar spondylosis with lumbar facet arthropathy without myelopathy





POSTOPERATIVE DIAGNOSIS: 1-lumbar radiculopathy. 2-lumbar degenerative disc 

disease.  3-lumbar spondylosis with facet arthropathy without myelopathy





PROCEDURE


1. Transforaminal epidural steroid injection under fluoroscopic guidance at 

right L3-4 level.  (Fluoroscopy images stored on file in the radiology 

Department )


2. Lumbar epidurogram .





ANESTHESIA: Local with 1% lidocaine 3 ml.


EBL: Minimal


PROCEDURE INDICATION: The patient with low back pain and radiculopathy symptoms 

unresponsive to conservative treatment.


PROCEDURE DESCRIPTION / TECHNIQUE: 


  The patient was seen and identified in the preoperative area. Risks, benefits,

complications, and alternatives were discussed with the patient. The patient 

agreed to proceed with the procedure and signed the consent. IV was started, and

vital signs were stable.


  Patient was taken to the OR and time out was completed. The patient was placed

 in the prone position on procedure table and a pillow was placed under the 

abdomen to reduce lumbar lordosis. The  lumbosacral area was prepped  and draped

in the usual sterile fashion. Critical pause was taken. Vital signs were closely

monitored during the procedure. 


Using oblique fluoroscopy, the chin of the ``Geovani dog at right  L3-4  level 

was identified, and the skin and deeper tissues just below was localized with 1%

lidocaine. Subsequently, a 22-gauge 3.5-inch spinal needle was advanced under a 

tunneled view fluoroscopic guidance just underneath the chin of the `Mariahy dog

 at the right L3-4  Under lateral fluoroscopy, the needle was then advanced to 

the posterior border of the  interforaminal  space. After negative aspiration of

CSF and blood and with no paresthesias, 1 mL Isovue  200 contrast dye was 

injected excellent epidurogram and outlining of the  nerve root Subsequently, 3 

mL of block solution containing 40 mg Depo-Medrol  and 2 mL of 0.9% normal 

saline PF  was injected. Needle was removed . At the end of the procedure, skin 

was cleansed, and bandages were applied.


COMPLICATIONS:none 


DISPOSITION / PLANS: The patient was placed in a supine position and transferred

to the recovery area in a stable condition for observation. There was no 

evidence of lower extremity motor or sensory deficit after the procedure.  

Patient was discharged from the recovery room after meeting discharge criteria. 

Home discharge instructions were given to the patient by the staff. The patient 

was reexamined prior to discharge.

## 2024-03-28 NOTE — FL
EXAMINATION TYPE: FL guided pain mgmt statistic

 

DATE OF EXAM: 3/28/2024

 

HISTORY: Fluoroscopy  time

 

Total dose area product (DAP) in uGy*m?, mGy*cm? (or similar): 0.60431

 

IMPRESSION:

1. Fluoroscopy time.

## 2024-04-10 ENCOUNTER — HOSPITAL ENCOUNTER (OUTPATIENT)
Dept: HOSPITAL 47 - PNWHC3 | Age: 89
End: 2024-04-10
Attending: ANESTHESIOLOGY
Payer: MEDICARE

## 2024-04-10 VITALS — DIASTOLIC BLOOD PRESSURE: 89 MMHG | TEMPERATURE: 97.1 F | HEART RATE: 96 BPM | SYSTOLIC BLOOD PRESSURE: 173 MMHG

## 2024-04-10 DIAGNOSIS — Z88.2: ICD-10-CM

## 2024-04-10 DIAGNOSIS — M51.36: Primary | ICD-10-CM

## 2024-04-10 PROCEDURE — 99211 OFF/OP EST MAY X REQ PHY/QHP: CPT

## 2024-04-10 NOTE — P.PAINPG
PQRS Measure Charge Sheet


Comment: 





A 88 yr old male w female  at side presents today w severe and chronic 

LBP x 1 yr secondary to DDD, spondylosis and facet arthropathy without 

myelopathy for evaluation s/p R TFESI L3-L4 #1. Pt states she experienced 60 % 

pain relief x 2 wks s/p procedure. Pt states pain level is provoked at  6 /10 in

intensity, constant, localized in the  R lower lumbar spine, predominantly axial

achy in character w occasional shooting pain towards the RLE.  Pain is provoked 

by walking.  Pain is alleviated by PT semi weekly x 6 wks in Apr 2023 which 

provoked pain, massage therapy semi weekly x 3 wks until Mar 2023 which stopped 

due to out of pocket costs, THC topical, repositioning, sitting and rest. 

Oswestry axial pain score of 18.





Interventional procedures include MARTINA L4-L5 x1, R MBB L3-L5 x2, BL TPIs L2-S1 

x1, R TFESI L3-L4 x1 (Mar 2024)


Medications include Cannabis topical


    


Physical Examinations  :


                Constitutional : Cooperative , not in acute distress .          

                                                                                

                                                                                

                                                                                

                                                                                

     


                Neurologic :   Cranial nerve II   to  XII  intact. No focal 

neurological deficits.


                Psychiatric : alert & oriented  x 3. Matching mood & appropriate

affect. Judgment & insight intact. 


                Musculoskeletal :     


                               Cervical Spine 


                                         Motor strength in the deltoid and 

biceps: Normal  right side. Normal  Left side


                                         Motor strength biceps and the wrist 

extensors:   Normal right side . Normal left side 


                                         Motor strength in the triceps muscle: 

Normal right side.  Normal  left side  


                                         Deep tendon reflexes:  Normal at the 

biceps. Normal at Brachioradialis. Normal at triceps


                                         Vertebral body tenderness to deep 

palpation over 


                                         Cervical facet loading test: positive 

bilaterally


                                         Spurling test: positive bilaterally


                                         Neck distraction test: positive 

bilaterally


                                         Kelly sign: positive bilaterally


                                  Lumbar spine


                                         Motor strength lower extremities ,thigh

and legs  5/5 Right side ,  5/5  Left side 


                                         Deep tendon reflexes :   Normal  Knee 

Jerk. Normal   Ankle Jerk  


                                         Vertebral body tenderness over L3


                                                   Leavitt test positive R L3-L4


                                         Taut bands w twitch response  


                                         Lumbar facet Loading Test: positive 

Right  / positive Left  over R L4-L5, L5-S1


                                         Range of motion of the lumbar spine  

Flexion  30 degrees,   extension   10 degrees


                                         Straight Leg Raise test: Left/ Right 

positive at   degrees   


                                         Sonal test: positive right /  positive 

left.


                                         Severe tenderness over the Sacroiliac 

joint  on the Right / Left  sides   


                                         Gaenslen  test: positive bilaterally


                                         Seated flexion test: positive 

bilaterally.


                                 Sacral spine :


                                         Severe tenderness over the Sacroiliac 

joint:  right side / left side 


                                         Range of motion: Flexion of the lumbar 

spine <60 degrees


                                         Range of motion: Extension of the 

lumbar spine <20 degrees


                                         Gaenslen's Test positive 


                                         Elie's Test positive 


                                         Sonal test: positive  right side /  

left side


                                         Thigh Thrust Test


                                         Sacral Thrust Test


Imaging:


Lumbar MRI non contrast from 5/12/23 reviewed





Assessment/ Plan : 


Lumbar DDD


Recommendation of medication management. Robaxin and Lidoderm 5% w 1 RF. Use, 

side effects, adverse reactions, safe storage discussed. Pt acknowledged 

understanding. All questions answered.


I have spent greater than 30 minutes on patient care today. Dr Gibbons was 

available by phone for the evaluation of this patient. The time was used to 

review the medical records including relevant urine studies and Prescription 

history (MAPs), review of the available imaging, evaluation and examination of 

the patient, coordination of care with the medical staff and if applicable 

referring physicians, as well as creation of the medical record


   





- Pain Location


  ** Lower Back


Non-Pharmacological Interventions: Inactivity, Massage, Physical Therapy, 

Position/Reposition


Pharmacological Interventions: Epidural, PRN Medication, Topical Medication


PQRS Narrative: 


                                        





Hx Alcohol Use (MH)              No








Home Medications: 


Ambulatory Orders





Atorvastatin [Lipitor] 20 mg PO QAM 04/07/21 


Esomeprazole Magnesium [NexIUM] 40 mg PO QAM 04/07/21 


Losartan [Cozaar] 25 mg PO QAM 04/07/21 


Aspirin [Adult Low Dose Aspirin EC] 81 mg PO BID #60 tablet. 04/14/21 


Docusate [Colace] 100 mg PO DAILY PRN 08/11/23 


Mv-Min/Folic/K1/Lycopen/Lutein [Centrum Silver Men Tablet] 1 each PO QAM 

08/11/23 


Naproxen Sodium [Aleve] 400 mg PO Q8HR PRN 01/24/24 


Lidocaine 5% Patch [Lidoderm] 1 each TP DAILY 30 Days #30 patch 04/10/24 


methocarbamoL [Robaxin] 500 mg PO TID PRN 30 Days #90 tab 04/10/24 











Controlled Substance Measures





- Controlled Substance Measures


Is patient prescribed a controlled substance at discharge?: No

## 2024-04-15 ENCOUNTER — HOSPITAL ENCOUNTER (OUTPATIENT)
Dept: HOSPITAL 47 - RADXRYALE | Age: 89
Discharge: HOME | End: 2024-04-15
Attending: PHYSICIAN ASSISTANT
Payer: MEDICARE

## 2024-04-15 DIAGNOSIS — J44.9: Primary | ICD-10-CM

## 2024-04-15 DIAGNOSIS — I10: ICD-10-CM

## 2024-04-15 DIAGNOSIS — G93.2: ICD-10-CM

## 2024-04-15 PROCEDURE — 71046 X-RAY EXAM CHEST 2 VIEWS: CPT

## 2024-04-15 NOTE — XR
EXAMINATION TYPE: XR chest 2V

 

DATE OF EXAM: 4/15/2024 12:03 PM

 

CLINICAL INDICATION:Male, 88 years old with history of G932,I10 INTRACRANIAL HTN, HTN; YCH

 

COMPARISON: Chest radiographs from 12/21/2022

 

TECHNIQUE: XR chest 2V Frontal and lateral views of the chest.

 

FINDINGS: 

Lungs/Pleura: There is flattening of the diaphragm with increased lucency of the lungs. No evidence o
f pneumothorax, pleural effusion or focal consolidation. 

Pulmonary vascularity: Unremarkable.

Heart/mediastinum: Cardiomediastinal silhouette is unremarkable.

Musculoskeletal: No acute osseous pathology.

 

Other findings: None

 

IMPRESSION: 

1. No acute cardiopulmonary disease process.

 

2. COPD changes.

## 2024-07-24 ENCOUNTER — HOSPITAL ENCOUNTER (OUTPATIENT)
Dept: HOSPITAL 47 - PNWHC3 | Age: 89
End: 2024-07-24
Attending: ANESTHESIOLOGY
Payer: MEDICARE

## 2024-07-24 VITALS
SYSTOLIC BLOOD PRESSURE: 172 MMHG | RESPIRATION RATE: 16 BRPM | TEMPERATURE: 97.6 F | DIASTOLIC BLOOD PRESSURE: 75 MMHG | HEART RATE: 67 BPM

## 2024-07-24 DIAGNOSIS — Z88.8: ICD-10-CM

## 2024-07-24 DIAGNOSIS — M51.37: Primary | ICD-10-CM

## 2024-07-24 DIAGNOSIS — M47.817: ICD-10-CM

## 2024-07-24 PROCEDURE — 99211 OFF/OP EST MAY X REQ PHY/QHP: CPT

## 2024-07-24 NOTE — P.PAINPG
Objective





- Vital Signs


Vital signs: 


                                   Vital Signs











Temp  97.6 F   07/24/24 10:36


 


Pulse  67   07/24/24 10:36


 


Resp  16   07/24/24 10:36


 


BP  172/75   07/24/24 10:36


 


Pulse Ox  94 L  07/24/24 10:36


 


FiO2      








                                 Intake & Output











 07/23/24 07/24/24 07/24/24





 18:59 06:59 18:59


 


Weight   81.647 kg














PQRS Measure Charge Sheet


Mode of Arrival: Ambulatory


Comment: 





A 88 yr old male w female  at [facility] presents today w severe and chronic 

LBP x 1 yr secondary to DDD, spondylosis and facet arthropathy without 

myelopathy for evaluation. Pt states pain level is provoked at  6 /10 in 

intensity, constant, localized in the  R lower lumbar spine, predominantly axial

achy in character w occasional shooting pain towards the RLE.  Pain is provoked 

by walking.  Pain is alleviated by PT semi weekly x 6 wks in Apr 2023 which 

provoked pain, massage therapy semi weekly x 3 wks until Mar 2023 which stopped 

due to out of pocket costs, THC topical, repositioning, sitting and rest. 

Oswestry axial pain score of 18.





Interventional procedures include MARTINA L4-L5 x1, R MBB L3-L5 x2, BL TPIs L2-S1 

x1, R TFESI L3-L4 x1 (Mar 2024)


Medications include Cannabis topical


    


Physical Examinations  :


                Constitutional : Cooperative , not in acute distress .          

                                                                                

                                                                                

                                                                                

                                                                                

     


                Neurologic :   Cranial nerve II   to  XII  intact. No focal 

neurological deficits.


                Psychiatric : alert & oriented  x 3. Matching mood & appropriate

affect. Judgment & insight intact. 


                Musculoskeletal :     


                               Cervical Spine 


                                         Motor strength in the deltoid and 

biceps: Normal  right side. Normal  Left side


                                         Motor strength biceps and the wrist 

extensors:   Normal right side . Normal left side 


                                         Motor strength in the triceps muscle: 

Normal right side.  Normal  left side  


                                         Deep tendon reflexes:  Normal at the 

biceps. Normal at Brachioradialis. Normal at triceps


                                         Vertebral body tenderness to deep 

palpation over 


                                         Cervical facet loading test: positive 

bilaterally


                                         Spurling test: positive bilaterally


                                         Neck distraction test: positive 

bilaterally


                                         Kelly sign: positive bilaterally


                                  Lumbar spine


                                         Motor strength lower extremities ,thigh

and legs  5/5 Right side ,  5/5  Left side 


                                         Deep tendon reflexes :   Normal  Knee 

Jerk. Normal   Ankle Jerk  


                                         Vertebral body tenderness over L5


                                                   Leavitt test positive BL L5-S1


                                         Taut bands w twitch response  


                                         Lumbar facet Loading Test: positive 

Right  / positive Left  over R L4-L5, L5-S1


                                         Range of motion of the lumbar spine  

Flexion  30 degrees,   extension   10 degrees


                                         Straight Leg Raise test: Left/ Right 

positive at   degrees   


                                         Sonal test: positive right /  positive 

left.


                                         Severe tenderness over the Sacroiliac 

joint  on the Right / Left  sides   


                                         Gaenslen  test: positive bilaterally


                                         Seated flexion test: positive 

bilaterally.


                                 Sacral spine :


                                         Severe tenderness over the Sacroiliac j

oint:  right side / left side 


                                         Range of motion: Flexion of the lumbar 

spine <60 degrees


                                         Range of motion: Extension of the 

lumbar spine <20 degrees


                                         Gaenslen's Test positive 


                                         Elie's Test positive 


                                         Sonal test: positive  right side /  

left side


                                         Thigh Thrust Test


                                         Sacral Thrust Test


Imaging:


Lumbar MRI non contrast from 5/12/23 reviewed





Assessment/ Plan : 


Lumbar DDD


Recommendation of BL TFESI L5-S1 #2 and medication management. Discontinue 

Robaxin and substitute Flexeril 10mg #30 w 1 RF. Opiate/ narcotic agreement 

signed 7/24/24. Norco 5/325mg #60 w 1 RF. Use, side effects, adverse reactions, 

safe storage discussed. Pt acknowledged understanding. All questions answered.


I have spent greater than 30 minutes on patient care today. Dr Gibbons was 

available by phone for the evaluation of this patient. The time was used to 

review the medical records including relevant urine studies and Prescription 

history (MAPs), review of the available imaging, evaluation and examination of 

the patient, coordination of care with the medical staff and if applicable 

referring physicians, as well as creation of the medical record


   





- Pain Location


  ** Lower Back


Non-Pharmacological Interventions: Stretching


Pharmacological Interventions: PRN Medication


PQRS Narrative: 


                                        





Blood Pressure                   172/75


Pain Intensity [Lower Back]      10


Scale Used                       Numeric (1 - 10)


Hx Alcohol Use (MH)              No








Home Medications: 


Ambulatory Orders





Atorvastatin [Lipitor] 20 mg PO QAM 04/07/21 


Esomeprazole Magnesium [NexIUM] 40 mg PO QAM 04/07/21 


Losartan [Cozaar] 25 mg PO QAM 04/07/21 


Aspirin [Adult Low Dose Aspirin EC] 81 mg PO BID #60 tablet. 04/14/21 


Docusate [Colace] 100 mg PO DAILY PRN 08/11/23 


Mv-Min/Folic/K1/Lycopen/Lutein [Centrum Silver Men Tablet] 1 each PO QAM 

08/11/23 


Naproxen Sodium [Aleve] 400 mg PO Q8HR PRN 01/24/24 


Lidocaine 5% Patch [Lidoderm 5% Patch] 1 each TP DAILY 30 Days #30 patch 

07/22/24 


Cyclobenzaprine [Flexeril] 10 mg PO HS PRN 30 Days #30 tab 07/24/24 


Cyclobenzaprine [Flexeril] 10 mg PO HS PRN 30 Days #30 tab 07/24/24 


HYDROcodone/APAP 5-325MG [Norco 5-325] 1 tab PO BID PRN 30 Days #60 tab 07/24/24




HYDROcodone/APAP 5-325MG [[place] 5-325] 1 tab PO BID PRN 30 Days #60 tab 07/24/24













Controlled Substance Measures





- Controlled Substance Measures


Is patient prescribed a controlled substance at discharge?: Yes


When asked, does pt state using other controlled substances?: No


If prescribed controlled substance>3 days was MAPS reviewed?: Yes


If Rx opioid, was Start Talking consent form obtained?: Yes


Was information provided regarding opioid addiction?: Yes

## 2024-08-06 ENCOUNTER — HOSPITAL ENCOUNTER (OUTPATIENT)
Dept: HOSPITAL 47 - ORPAIN | Age: 89
Discharge: HOME | End: 2024-08-06
Attending: ANESTHESIOLOGY
Payer: MEDICARE

## 2024-08-06 DIAGNOSIS — M54.16: Primary | ICD-10-CM

## 2024-08-06 DIAGNOSIS — K21.9: ICD-10-CM

## 2024-08-06 DIAGNOSIS — Z79.82: ICD-10-CM

## 2024-08-06 DIAGNOSIS — I10: ICD-10-CM

## 2024-08-06 DIAGNOSIS — Z79.899: ICD-10-CM

## 2024-08-06 DIAGNOSIS — Z88.8: ICD-10-CM

## 2024-08-06 DIAGNOSIS — Z87.891: ICD-10-CM

## 2024-10-01 NOTE — FL
EXAMINATION TYPE: FL guided pain mgmt statistic

 

COMPARISON: Pre Operative Images if available both CT/MRI or plain film 

 

CLINICAL INDICATION: Male, 89 years old with history of TONIE TRIGEMINAL NERVE BLOCK PAIN SERVICES;

 

TECHNIQUE: FL guided pain mgmt statistic, multiple fluoroscopic images provided for procedure.

 

Total fluoroscopy time: 17 seconds 

Total submitted images to PACS: 5 

DAP: 0.49961 mGym2 Gycm2 uGym2 cGycm2 

 

FINDINGS:

Fluoroscopic images during injection for pain management demonstrate multilevel degeneration changes 
throughout the spine. No evidence for fracture. No acute process identified.

 

IMPRESSION:

1.  No evidence for intraoperative complication.

2.  Please see the operative/procedural note for further details.

 

[facility], Workstation: [id number], 10/1/2024 6:17 PM

## 2024-10-02 ENCOUNTER — HOSPITAL ENCOUNTER (OUTPATIENT)
Dept: HOSPITAL 47 - PNWHC3 | Age: 89
End: 2024-10-02
Attending: ANESTHESIOLOGY
Payer: MEDICARE

## 2024-10-02 VITALS — RESPIRATION RATE: 19 BRPM | SYSTOLIC BLOOD PRESSURE: 118 MMHG | DIASTOLIC BLOOD PRESSURE: 77 MMHG | HEART RATE: 83 BPM

## 2024-10-02 PROCEDURE — 99212 OFFICE O/P EST SF 10 MIN: CPT

## 2024-10-02 PROCEDURE — 80307 DRUG TEST PRSMV CHEM ANLYZR: CPT

## 2024-10-02 NOTE — P.PAINPG
PQRS Measure Charge Sheet


Comment: 





A 88 yr old male w female  at side presents today w severe and chronic 

LBP x 1 yr secondary to radiculopathy, spondylosis and facet arthropathy without

myelopathy for evaluation s/p BL TFESI L5-S1 #2 and medication refills. Pt 

states he experienced  % pain relief x 8 wks s/p procedure. Pt states pain level

is provoked at  6 /10 in intensity, constant, localized in the  R lower lumbar 

spine, predominantly axial achy in character w occasional shooting pain towards 

the RLE.  Pain is provoked by walking.  Pain is alleviated by PT semi weekly x 6

wks in Apr 2023 which provoked pain, massage therapy semi weekly x 3 wks until 

Mar 2023 which stopped due to out of pocket costs, THC topical, repositioning, 

sitting and rest. 





Interventional procedures include MARTINA L4-L5 x1, R MBB L3-L5 x2, BL TPIs L2-S1 

x1, R TFESI L3-L4 x2 (Mar 2024, Aug 2024)


Medications include Cannabis topical (disc per pt), Norco 5/325mg #60


    


Physical Examinations  :


                Constitutional : Cooperative , not in acute distress .          

                                                                                

                                                                                

                                                                                

                                                                                

     


                Neurologic :   Cranial nerve II   to  XII  intact. No focal 

neurological deficits.


                Psychiatric : alert & oriented  x 3. Matching mood & appropriate

affect. Judgment & insight intact. 


                Musculoskeletal :     


                               Cervical Spine 


                                         Motor strength in the deltoid and 

biceps: Normal  right side. Normal  Left side


                                         Motor strength biceps and the wrist 

extensors:   Normal right side . Normal left side 


                                         Motor strength in the triceps muscle: 

Normal right side.  Normal  left side  


                                         Deep tendon reflexes:  Normal at the 

biceps. Normal at Brachioradialis. Normal at triceps


                                         Vertebral body tenderness to deep 

palpation over 


                                         Cervical facet loading test: positive 

bilaterally


                                         Spurling test: positive bilaterally


                                         Neck distraction test: positive 

bilaterally


                                         Kelly sign: positive bilaterally


                                  Lumbar spine


                                         Motor strength lower extremities ,thigh

and legs  5/5 Right side ,  5/5  Left side 


                                         Deep tendon reflexes :   Normal  Knee 

Jerk. Normal   Ankle Jerk  


                                         Vertebral body tenderness over L5


                                                   Leavitt test positive BL L5-S1


                                         Taut bands w twitch response  


                                         Lumbar facet Loading Test: positive 

Right  / positive Left  over R L4-L5, L5-S1


                                         Range of motion of the lumbar spine  

Flexion  30 degrees,   extension   10 degrees


                                         Straight Leg Raise test: Left/ Right 

positive at   degrees   


                                         Sonal test: positive right /  positive 

left.


                                         Severe tenderness over the Sacroiliac 

joint  on the Right / Left  sides   


                                         Gaenslen  test: positive bilaterally


                                         Seated flexion test: positive 

bilaterally.


                                 Sacral spine :


                                         Severe tenderness over the Sacroiliac 

joint:  right side / left side 


                                         Range of motion: Flexion of the lumbar 

spine <60 degrees


                                         Range of motion: Extension of the 

lumbar spine <20 degrees


                                         Gaenslen's Test positive 


                                         Elie's Test positive 


                                         Sonal test: positive  right side /  

left side


                                         Thigh Thrust Test


                                         Sacral Thrust Test


Imaging:


Lumbar MRI non contrast from 5/12/23 reviewed





Assessment/ Plan : 


Lumbar radiculopathy


Recommendation of medication management. Norco 5/325mg #60 , Flexeril 10mg #30 w

1 RF. Opiate/ narcotic agreement signed 7/24/24. UDS collected 10/2/24. Use, 

side effects, adverse reactions, safe storage discussed. Pt acknowledged under

standing. All questions answered.


I have spent greater than 30 minutes on patient care today. Dr Gibbons was 

available by phone for the evaluation of this patient. The time was used to 

review the medical records including relevant urine studies and Prescription 

history (MAPs), review of the available imaging, evaluation and examination of 

the patient, coordination of care with the medical staff and if applicable 

referring physicians, as well as creation of the medical record


   


PQRS Narrative: 


                                        





Hx Alcohol Use (MH)              No








Home Medications: 


Ambulatory Orders





Atorvastatin [Lipitor] 20 mg PO QAM 04/07/21 


Esomeprazole Magnesium [NexIUM] 40 mg PO QAM 04/07/21 


Losartan [Cozaar] 25 mg PO QAM 04/07/21 


Aspirin [Adult Low Dose Aspirin EC] 81 mg PO BID #60 tablet. 04/14/21 


Docusate [Colace] 100 mg PO DAILY PRN 08/11/23 


Mv-Min/Folic/K1/Lycopen/Lutein [Centrum Silver Men Tablet] 1 each PO QAM 08 /11/23 


Naproxen Sodium [Aleve] 400 mg PO Q8HR PRN 01/24/24 


Lidocaine 5% Patch [Lidoderm 5% Patch] 1 each TP DAILY 30 Days #30 patch 

07/22/24 


Cyclobenzaprine [Flexeril] 10 mg PO HS PRN 30 Days #30 tab 10/02/24 


HYDROcodone/APAP 5-325MG [Norco 5-325] 1 tab PO BID PRN 30 Days #60 tab 10/02/24




HYDROcodone/APAP 5-325MG [[place] 5-325] 1 tab PO BID PRN 30 Days #60 tab 10/02/24













Controlled Substance Measures





- Controlled Substance Measures


Is patient prescribed a controlled substance at discharge?: Yes


When asked, does pt state using other controlled substances?: No


If prescribed controlled substance>3 days was MAPS reviewed?: Yes

## 2024-11-25 ENCOUNTER — HOSPITAL ENCOUNTER (OUTPATIENT)
Dept: HOSPITAL 47 - PNWHC3 | Age: 89
End: 2024-11-25
Attending: ANESTHESIOLOGY
Payer: MEDICARE

## 2024-11-25 VITALS — HEART RATE: 60 BPM | DIASTOLIC BLOOD PRESSURE: 67 MMHG | RESPIRATION RATE: 16 BRPM | SYSTOLIC BLOOD PRESSURE: 132 MMHG

## 2024-11-25 DIAGNOSIS — Z88.8: ICD-10-CM

## 2024-11-25 DIAGNOSIS — M54.16: Primary | ICD-10-CM

## 2024-11-25 PROCEDURE — 99211 OFF/OP EST MAY X REQ PHY/QHP: CPT
